# Patient Record
Sex: FEMALE | Race: WHITE | Employment: OTHER | URBAN - METROPOLITAN AREA
[De-identification: names, ages, dates, MRNs, and addresses within clinical notes are randomized per-mention and may not be internally consistent; named-entity substitution may affect disease eponyms.]

---

## 2017-04-24 PROCEDURE — 82607 VITAMIN B-12: CPT | Performed by: FAMILY MEDICINE

## 2017-09-19 PROCEDURE — 87081 CULTURE SCREEN ONLY: CPT | Performed by: EMERGENCY MEDICINE

## 2018-06-18 PROCEDURE — 82607 VITAMIN B-12: CPT | Performed by: FAMILY MEDICINE

## 2018-06-18 PROCEDURE — 88175 CYTOPATH C/V AUTO FLUID REDO: CPT | Performed by: NURSE PRACTITIONER

## 2018-09-06 ENCOUNTER — OFFICE VISIT (OUTPATIENT)
Dept: SURGERY | Facility: CLINIC | Age: 68
End: 2018-09-06
Payer: MEDICARE

## 2018-09-06 VITALS
SYSTOLIC BLOOD PRESSURE: 117 MMHG | BODY MASS INDEX: 30.76 KG/M2 | TEMPERATURE: 98 F | HEIGHT: 67 IN | WEIGHT: 196 LBS | DIASTOLIC BLOOD PRESSURE: 75 MMHG

## 2018-09-06 DIAGNOSIS — Z80.0 FAMILY HISTORY OF COLON CANCER: ICD-10-CM

## 2018-09-06 DIAGNOSIS — K21.9 GASTROESOPHAGEAL REFLUX DISEASE, ESOPHAGITIS PRESENCE NOT SPECIFIED: ICD-10-CM

## 2018-09-06 DIAGNOSIS — K63.5 POLYP OF COLON, UNSPECIFIED PART OF COLON, UNSPECIFIED TYPE: Primary | ICD-10-CM

## 2018-09-06 DIAGNOSIS — I10 ESSENTIAL HYPERTENSION, BENIGN: ICD-10-CM

## 2018-09-06 PROCEDURE — 99203 OFFICE O/P NEW LOW 30 MIN: CPT | Performed by: COLON & RECTAL SURGERY

## 2018-09-06 RX ORDER — POLYETHYLENE GLYCOL 3350, SODIUM CHLORIDE, SODIUM BICARBONATE, POTASSIUM CHLORIDE 420; 11.2; 5.72; 1.48 G/4L; G/4L; G/4L; G/4L
POWDER, FOR SOLUTION ORAL
Qty: 1 BOTTLE | Refills: 0 | Status: SHIPPED | OUTPATIENT
Start: 2018-09-06 | End: 2019-01-07 | Stop reason: ALTCHOICE

## 2018-09-06 NOTE — PATIENT INSTRUCTIONS
The patient is a pleasant 81-year-old female who presents in consultation for a colonoscopy. The patient is referred to me by her primary provider, Dr. Deal. The patient has had a prior colonoscopy. The last one was about 3 years ago.   She has a pers prophylaxis. All risks, benefits, complications and alternatives to the proposed operation were fully discussed with the patient. All questions from the patient were answered in detail.  A description of the procedure and its possible outcomes was full

## 2018-09-06 NOTE — H&P
New Patient Visit Note       Active Problems      1. Polyp of colon, unspecified part of colon, unspecified type    2. Gastroesophageal reflux disease, esophagitis presence not specified    3. Essential hypertension, benign    4.  Family history of colon ca the PA  I performed my own physical exam and obtained the history as detailed above.   I have made all appropriate changes in documentation as necessary  I attest to the accuracy of this note as detailed above    Kevin Morales MD [facility] 68    My total Family History    The past medical and past surgical history have been reviewed by me today.     Past Medical History:   Diagnosis Date   • Abnormal screening CT of heart 5/9/2016    calcium score 28   • Allergic rhinitis due to pollen (aka 4770)    • Anxie Disorder Mother      Pe   • Colon Cancer Mother    • Infectious Disease Daughter      viral meningitis   • Obesity Sister    • Hypertension Sister    • Infectious Disease Sister      MRSA   • Pulmonary Disease Sister      on O2   • Heart Attack Sister    • Outpatient Prescriptions:  Losartan Potassium 50 MG Oral Tab Take 1 tablet (50 mg total) by mouth daily. Disp: 90 tablet Rfl: 3   Nabumetone 500 MG Oral Tab Take 1 tablet (500 mg total) by mouth daily.  Disp: 90 tablet Rfl: 3   Citalopram Hydrobromide 20 MG problems and sleep disturbance.        Physical Findings   /75 (BP Location: Left arm, Patient Position: Sitting, Cuff Size: adult)   Temp 98.1 °F (36.7 °C) (Oral)   Ht 67\"   Wt 196 lb   BMI 30.70 kg/m²   Physical Exam   Constitutional: She is orient since age 49. The patient denies any new constitutional or gastrointestinal symptoms since her last examination. She specifically denies unintended weight loss, loss of appetite, fatigue, nausea/vomiting, diarrhea/constipation or blood in the stool. encounter. Imaging & Referrals   None    Follow Up  Return in 5 days (on 9/11/2018) for colonoscopy.     Apryl Alfaro MD

## 2019-04-15 ENCOUNTER — APPOINTMENT (OUTPATIENT)
Dept: LAB | Facility: HOSPITAL | Age: 69
End: 2019-04-15
Payer: MEDICARE

## 2019-04-15 DIAGNOSIS — S83.249A TEAR OF MEDIAL MENISCUS OF KNEE JOINT: ICD-10-CM

## 2019-04-15 PROCEDURE — 93010 ELECTROCARDIOGRAM REPORT: CPT | Performed by: INTERNAL MEDICINE

## 2019-04-15 PROCEDURE — 80048 BASIC METABOLIC PNL TOTAL CA: CPT

## 2019-04-15 PROCEDURE — 93005 ELECTROCARDIOGRAM TRACING: CPT

## 2019-04-15 PROCEDURE — 36415 COLL VENOUS BLD VENIPUNCTURE: CPT

## 2019-04-19 ENCOUNTER — HOSPITAL ENCOUNTER (OUTPATIENT)
Facility: HOSPITAL | Age: 69
Setting detail: HOSPITAL OUTPATIENT SURGERY
Discharge: HOME | End: 2019-04-19
Attending: ORTHOPAEDIC SURGERY | Admitting: ORTHOPAEDIC SURGERY
Payer: MEDICARE

## 2019-04-19 ENCOUNTER — ANESTHESIA EVENT (OUTPATIENT)
Dept: SURGERY | Facility: HOSPITAL | Age: 69
End: 2019-04-19
Payer: MEDICARE

## 2019-04-19 ENCOUNTER — ANESTHESIA (OUTPATIENT)
Dept: SURGERY | Facility: HOSPITAL | Age: 69
End: 2019-04-19
Payer: MEDICARE

## 2019-04-19 VITALS
HEART RATE: 76 BPM | WEIGHT: 195.31 LBS | SYSTOLIC BLOOD PRESSURE: 110 MMHG | RESPIRATION RATE: 16 BRPM | OXYGEN SATURATION: 95 % | HEIGHT: 67 IN | TEMPERATURE: 99 F | BODY MASS INDEX: 30.65 KG/M2 | DIASTOLIC BLOOD PRESSURE: 64 MMHG

## 2019-04-19 DIAGNOSIS — S83.242A TEAR OF MEDIAL MENISCUS OF LEFT KNEE, UNSPECIFIED TEAR TYPE, UNSPECIFIED WHETHER OLD OR CURRENT TEAR, INITIAL ENCOUNTER: ICD-10-CM

## 2019-04-19 DIAGNOSIS — S83.249A TEAR OF MEDIAL MENISCUS OF KNEE JOINT: Primary | ICD-10-CM

## 2019-04-19 DIAGNOSIS — M23.201 OLD TEAR OF LATERAL MENISCUS OF LEFT KNEE, UNSPECIFIED TEAR TYPE: ICD-10-CM

## 2019-04-19 PROBLEM — M23.42 CHONDRAL LOOSE BODY OF LEFT KNEE JOINT: Status: ACTIVE | Noted: 2019-04-19

## 2019-04-19 PROBLEM — M17.12 PRIMARY OSTEOARTHRITIS OF LEFT KNEE: Status: ACTIVE | Noted: 2019-04-19

## 2019-04-19 PROBLEM — M23.322 DEGENERATIVE TEAR OF POSTERIOR HORN OF MEDIAL MENISCUS, LEFT: Status: ACTIVE | Noted: 2019-04-19

## 2019-04-19 PROBLEM — M23.301 DEGENERATIVE TEAR OF LATERAL MENISCUS, LEFT: Status: ACTIVE | Noted: 2019-04-19

## 2019-04-19 PROCEDURE — 0SBD4ZZ EXCISION OF LEFT KNEE JOINT, PERCUTANEOUS ENDOSCOPIC APPROACH: ICD-10-PCS | Performed by: ORTHOPAEDIC SURGERY

## 2019-04-19 RX ORDER — ONDANSETRON 2 MG/ML
4 INJECTION INTRAMUSCULAR; INTRAVENOUS AS NEEDED
Status: DISCONTINUED | OUTPATIENT
Start: 2019-04-19 | End: 2019-04-19

## 2019-04-19 RX ORDER — HYDROMORPHONE HYDROCHLORIDE 1 MG/ML
0.4 INJECTION, SOLUTION INTRAMUSCULAR; INTRAVENOUS; SUBCUTANEOUS EVERY 5 MIN PRN
Status: DISCONTINUED | OUTPATIENT
Start: 2019-04-19 | End: 2019-04-19

## 2019-04-19 RX ORDER — TRAMADOL HYDROCHLORIDE 50 MG/1
TABLET ORAL
Qty: 40 TABLET | Refills: 0 | Status: SHIPPED | OUTPATIENT
Start: 2019-04-19 | End: 2019-07-08

## 2019-04-19 RX ORDER — HYDROCODONE BITARTRATE AND ACETAMINOPHEN 10; 325 MG/1; MG/1
1 TABLET ORAL AS NEEDED
Status: DISCONTINUED | OUTPATIENT
Start: 2019-04-19 | End: 2019-04-19

## 2019-04-19 RX ORDER — NALOXONE HYDROCHLORIDE 0.4 MG/ML
80 INJECTION, SOLUTION INTRAMUSCULAR; INTRAVENOUS; SUBCUTANEOUS AS NEEDED
Status: DISCONTINUED | OUTPATIENT
Start: 2019-04-19 | End: 2019-04-19

## 2019-04-19 RX ORDER — ACETAMINOPHEN 500 MG
1000 TABLET ORAL ONCE
Status: DISCONTINUED | OUTPATIENT
Start: 2019-04-19 | End: 2019-04-19

## 2019-04-19 RX ORDER — CEFAZOLIN SODIUM/WATER 2 G/20 ML
SYRINGE (ML) INTRAVENOUS
Status: DISCONTINUED
Start: 2019-04-19 | End: 2019-04-19

## 2019-04-19 RX ORDER — CEFAZOLIN SODIUM/WATER 2 G/20 ML
2 SYRINGE (ML) INTRAVENOUS ONCE
Status: COMPLETED | OUTPATIENT
Start: 2019-04-19 | End: 2019-04-19

## 2019-04-19 RX ORDER — BUPIVACAINE HYDROCHLORIDE AND EPINEPHRINE 5; 5 MG/ML; UG/ML
INJECTION, SOLUTION EPIDURAL; INTRACAUDAL; PERINEURAL AS NEEDED
Status: DISCONTINUED | OUTPATIENT
Start: 2019-04-19 | End: 2019-04-19

## 2019-04-19 RX ORDER — HYDROCODONE BITARTRATE AND ACETAMINOPHEN 10; 325 MG/1; MG/1
2 TABLET ORAL AS NEEDED
Status: DISCONTINUED | OUTPATIENT
Start: 2019-04-19 | End: 2019-04-19

## 2019-04-19 RX ORDER — MIDAZOLAM HYDROCHLORIDE 1 MG/ML
1 INJECTION INTRAMUSCULAR; INTRAVENOUS EVERY 5 MIN PRN
Status: DISCONTINUED | OUTPATIENT
Start: 2019-04-19 | End: 2019-04-19

## 2019-04-19 RX ORDER — SODIUM CHLORIDE, SODIUM LACTATE, POTASSIUM CHLORIDE, CALCIUM CHLORIDE 600; 310; 30; 20 MG/100ML; MG/100ML; MG/100ML; MG/100ML
INJECTION, SOLUTION INTRAVENOUS CONTINUOUS
Status: DISCONTINUED | OUTPATIENT
Start: 2019-04-19 | End: 2019-04-19

## 2019-04-19 RX ORDER — ACETAMINOPHEN 500 MG
1000 TABLET ORAL ONCE
COMMUNITY
End: 2019-07-08

## 2019-04-19 RX ORDER — METOCLOPRAMIDE HYDROCHLORIDE 5 MG/ML
10 INJECTION INTRAMUSCULAR; INTRAVENOUS AS NEEDED
Status: DISCONTINUED | OUTPATIENT
Start: 2019-04-19 | End: 2019-04-19

## 2019-04-19 NOTE — DISCHARGE SUMMARY
Patient ID:  Tarun Friday  [id number]  84 year old  4/2/1950    Admit Date: 4/19/2019    Discharge Date and Time: 4/19/2019     Attending Physician: Regan Orantes MD    Reason for admission: Tear of medial meniscus of left knee, unspecified tear typ humerus    Omeprazole Magnesium (PRILOSEC OTC) 20 MG Oral Tab EC  Take 1 tablet by mouth 2 (two) times daily.   Qty: 60 tablet Refills: 11  Associated Diagnoses:GERD (gastroesophageal reflux disease)    CALCIUM 500 OR  qd    Nabumetone 500 MG Oral Tab  Take

## 2019-04-19 NOTE — ANESTHESIA PREPROCEDURE EVALUATION
PRE-OP EVALUATION    Patient Name: Lucio Torrez    Pre-op Diagnosis: Tear of medial meniscus of left knee, unspecified tear type, unspecified whether old or current tear, initial encounter [J84.959I]  Old tear of lateral meniscus of left knee, unspec Pulmonary                           Neuro/Psych      (+) depression                              Past Surgical History:   Procedure Laterality Date   • ANESTH,SHOULDER REPLACEMENT Right 2/6/2015    right reverse shoulder replacement   • COLONOSCOPY  3/8/ Admission:  **None**

## 2019-04-19 NOTE — ANESTHESIA POSTPROCEDURE EVALUATION
[address] Patient Status:  Hospital Outpatient Surgery   Age/Gender 77year old female MRN [id number]   [facility] SURGERY Attending Belinda Rosas MD   Hosp Day # 0 PCP Simba Chase MD       Anesthesia Post-op N

## 2019-04-19 NOTE — H&P
[facility]    PATIENT'S NAME: Mary Hoang   ATTENDING PHYSICIAN: Francesca Bryant M.D.    PATIENT ACCOUNT#:   [de-identified]    LOCATION:  OR  OR [place] ROOMS 3 EDW  MEDICAL RECORD #:   [id number]       YOB: 1950  ADMISSION DATE: Hypercholesterolemia, hypertension, chronic depression, GERD, vitamin B12 deficiency, colonic polyps. PAST SURGICAL HISTORY:  Knee arthroscopy in 1993, CT scan of the heart, colonoscopy, D and C, excision of skin malignancy.     MEDICATIONS:  Amoxil, lut contraindication to the proposed surgery.     Dictated By Jose Alejandro Murphy M.D.  d: 04/19/2019 07:05:07  t: 04/19/2019 08:01:42  [facility] [id number]  [facility]/

## 2019-04-19 NOTE — BRIEF OP NOTE
Pre-Operative Diagnosis: Tear of medial meniscus of left knee, unspecified tear type, unspecified whether old or current tear, initial encounter [S83.242A]  Old tear of lateral meniscus of left knee, unspecified tear type [M23.201]     Post-Operative Diagn

## 2019-04-20 NOTE — OPERATIVE REPORT
[facility]    PATIENT'S NAME: Mary Hoang   ATTENDING PHYSICIAN: Derrek Hair M.D. OPERATING PHYSICIAN: Derrek Hair M.D.    PATIENT ACCOUNT#:   [de-identified]    [facility] EDWP 10  MEDICAL RECORD #:   [id number] incision, a dull trocar was passed and a cannula for the arthroscope.   Suprapatellar pouch was inspected first.  It appeared to be normal.  The articular surface the patella did show grade 4 chondromalacia changes, especially involving the inferior pole on tourniquet was released after 41 minutes. There was good capillary refill following release of the tourniquet. Blood loss was minimal, less than 2 mL. There was no specimen. The loose body was photographed and discarded.   The patient went to recovery r

## 2019-04-22 PROBLEM — Z47.89 ORTHOPEDIC AFTERCARE: Status: ACTIVE | Noted: 2019-04-22

## 2019-04-25 ENCOUNTER — ORDER TRANSCRIPTION (OUTPATIENT)
Dept: PHYSICAL THERAPY | Facility: HOSPITAL | Age: 69
End: 2019-04-25

## 2019-04-25 DIAGNOSIS — Z98.890 S/P LEFT KNEE ARTHROSCOPY: Primary | ICD-10-CM

## 2019-04-29 ENCOUNTER — HOSPITAL ENCOUNTER (OUTPATIENT)
Dept: PHYSICAL THERAPY | Facility: HOSPITAL | Age: 69
Setting detail: THERAPIES SERIES
Discharge: HOME | End: 2019-04-29
Attending: ORTHOPAEDIC SURGERY
Payer: MEDICARE

## 2019-04-29 DIAGNOSIS — Z98.890 S/P LEFT KNEE ARTHROSCOPY: ICD-10-CM

## 2019-04-29 PROCEDURE — 97110 THERAPEUTIC EXERCISES: CPT

## 2019-04-29 PROCEDURE — 97161 PT EVAL LOW COMPLEX 20 MIN: CPT

## 2019-04-29 NOTE — PROGRESS NOTES
POST-OP KNEE EVALUATION:   Referring Physician: Dr. Cinthya Sandoval  Diagnosis: s/p L knee arthroscopy, L medial meniscus tear (OA L knee, central loose body Date of Service: 4/29/2019     PATIENT SUMMARY   Krys Mc is a 77year old y/o female who presen knee all aspects, tender portals, tender L ITB and lateral gastrocnemius  Edema: at knee jt line R: 39 cm, L 40 cm; 10 cm above jt line R: 44 cm, L 46 cm; 10 cm below jt line R: 37.5 cm, L 37.5 cm  Sensation: intact light touch, symmetrically, no neuro com extraneous movement and 5x sit<>stand test <15 seconds to improve safety and independence with gait on uneven surfaces such as grass,   · Pt will be independent and compliant with comprehensive HEP to maintain progress achieved in PT    Frequency / Duratio

## 2019-05-01 ENCOUNTER — HOSPITAL ENCOUNTER (OUTPATIENT)
Dept: PHYSICAL THERAPY | Facility: HOSPITAL | Age: 69
Setting detail: THERAPIES SERIES
Discharge: HOME | End: 2019-05-01
Attending: ORTHOPAEDIC SURGERY
Payer: MEDICARE

## 2019-05-01 PROCEDURE — 97140 MANUAL THERAPY 1/> REGIONS: CPT

## 2019-05-01 PROCEDURE — 97110 THERAPEUTIC EXERCISES: CPT

## 2019-05-01 NOTE — PROGRESS NOTES
Dx: s/p L knee arthroscopy -medial meniscus  partialmenisectomy         Authorized # of Visits:  [address] visits POC         Next MD visit: none scheduled  Fall Risk: standard         Precautions: n/a             Subjective: Doing HEP.  Walked 2, 1/3 mil

## 2019-05-08 ENCOUNTER — APPOINTMENT (OUTPATIENT)
Dept: PHYSICAL THERAPY | Facility: HOSPITAL | Age: 69
End: 2019-05-08
Payer: MEDICARE

## 2019-05-09 ENCOUNTER — HOSPITAL ENCOUNTER (OUTPATIENT)
Dept: PHYSICAL THERAPY | Facility: HOSPITAL | Age: 69
Setting detail: THERAPIES SERIES
Discharge: HOME | End: 2019-05-09
Attending: ORTHOPAEDIC SURGERY
Payer: MEDICARE

## 2019-05-09 PROCEDURE — 97530 THERAPEUTIC ACTIVITIES: CPT

## 2019-05-09 PROCEDURE — 97110 THERAPEUTIC EXERCISES: CPT

## 2019-05-09 PROCEDURE — 97140 MANUAL THERAPY 1/> REGIONS: CPT

## 2019-05-09 NOTE — PROGRESS NOTES
Dx: s/p L knee arthroscopy -medial meniscus  partialmenisectomy         Authorized # of Visits:  [address] visits POC         Next MD visit: none scheduled  Fall Risk: standard         Precautions: n/a             Subjective: Wants to know if she can go x1'-brushing teeth Simulated BARRE class         Simulated putting with 4# weighted bar x1'     Simulated golf swing with unweighted bar             Stair negotiation  1 flight (12 steps)    Modified descending 1 step - x20-HEP          Charges: OSCAR Lino M

## 2019-05-13 ENCOUNTER — HOSPITAL ENCOUNTER (OUTPATIENT)
Dept: PHYSICAL THERAPY | Facility: HOSPITAL | Age: 69
Setting detail: THERAPIES SERIES
Discharge: HOME | End: 2019-05-13
Attending: ORTHOPAEDIC SURGERY
Payer: MEDICARE

## 2019-05-13 PROCEDURE — 97530 THERAPEUTIC ACTIVITIES: CPT

## 2019-05-13 PROCEDURE — 97110 THERAPEUTIC EXERCISES: CPT

## 2019-05-13 PROCEDURE — 97140 MANUAL THERAPY 1/> REGIONS: CPT

## 2019-05-13 NOTE — PROGRESS NOTES
Dx: s/p L knee arthroscopy -medial meniscus  partialmenisectomy         Authorized # of Visits:  [address] visits POC         Next MD visit: none scheduled  Fall Risk: standard         Precautions: n/a             Subjective:  Went back to barre class and light ball throw x10 SLS rebounder 1# x10       HEP progression-walking PRN, SLS x1'-brushing teeth Simulated BARRE class SLR 1.5# x30        Simulated putting with 4# weighted bar x1'     Simulated golf swing with unweighted bar     Stepping over hurdles

## 2019-05-15 ENCOUNTER — HOSPITAL ENCOUNTER (OUTPATIENT)
Dept: PHYSICAL THERAPY | Facility: HOSPITAL | Age: 69
Setting detail: THERAPIES SERIES
Discharge: HOME | End: 2019-05-15
Attending: ORTHOPAEDIC SURGERY
Payer: MEDICARE

## 2019-05-15 PROCEDURE — 97140 MANUAL THERAPY 1/> REGIONS: CPT

## 2019-05-15 PROCEDURE — 97530 THERAPEUTIC ACTIVITIES: CPT

## 2019-05-15 PROCEDURE — 97110 THERAPEUTIC EXERCISES: CPT

## 2019-05-15 NOTE — PROGRESS NOTES
Dx: s/p L knee arthroscopy -medial meniscus  partialmenisectomy         Authorized # of Visits:  [address] visits POC         Next MD visit: none scheduled  Fall Risk: standard         Precautions: n/a             Subjective: L knee feeling good today. Shuttle  DLP 37# x30  SLP 25# L x20  Heel raises 37#  20 reps  Shuttle  DLP 37# x30  SLP 25# L x20  Heel raises 37#  20 reps  Shuttle  DLP 50# x30  SLP 25# L x20  Heel raises 37#  20 reps Shuttle  DLP 50# x30    SLP 25# L x20  Heel raises 37#  20 reps

## 2019-05-20 ENCOUNTER — HOSPITAL ENCOUNTER (OUTPATIENT)
Dept: PHYSICAL THERAPY | Facility: HOSPITAL | Age: 69
Setting detail: THERAPIES SERIES
Discharge: HOME | End: 2019-05-20
Attending: ORTHOPAEDIC SURGERY
Payer: MEDICARE

## 2019-05-20 PROCEDURE — 97140 MANUAL THERAPY 1/> REGIONS: CPT

## 2019-05-20 PROCEDURE — 97110 THERAPEUTIC EXERCISES: CPT

## 2019-05-20 PROCEDURE — 97530 THERAPEUTIC ACTIVITIES: CPT

## 2019-05-20 NOTE — PROGRESS NOTES
Dx: s/p L knee arthroscopy -medial meniscus  partialmenisectomy         Authorized # of Visits:  [address] visits POC         Next MD visit: none scheduled  Fall Risk: standard         Precautions: n/a           Subjective: stair negotiation is getting e sec hold x15 LSU BOSU  w 3 sec hold x15 ASU BOSU  w 3 sec hold x15     SS orange spri   SS, monster walk FW/BW x1' ea     lunge FW/Ext BOSU x30 BOSU lunge R/L x20 wo UE   BOSU lunge R/L x20 wo UE    ASU BOSU x15     BOSU lunge R/L x20 wo UE    ASU BOSU x15

## 2019-05-22 ENCOUNTER — HOSPITAL ENCOUNTER (OUTPATIENT)
Dept: PHYSICAL THERAPY | Facility: HOSPITAL | Age: 69
Setting detail: THERAPIES SERIES
Discharge: HOME | End: 2019-05-22
Attending: ORTHOPAEDIC SURGERY
Payer: MEDICARE

## 2019-05-22 PROCEDURE — 97110 THERAPEUTIC EXERCISES: CPT

## 2019-05-22 PROCEDURE — 97140 MANUAL THERAPY 1/> REGIONS: CPT

## 2019-05-22 PROCEDURE — 97112 NEUROMUSCULAR REEDUCATION: CPT

## 2019-05-22 NOTE — PROGRESS NOTES
Dx: s/p L knee arthroscopy -medial meniscus  partialmenisectomy         Authorized # of Visits:  [address] visits POC         Next MD visit: none scheduled  Fall Risk: standard         Precautions: n/a           Subjective: Did some gardening today but w BOSU  w 3 sec hold x20 + punch R UE up    SS orange spri   SS, monster walk FW/BW x1' ea     lunge FW/Ext BOSU x30 BOSU lunge R/L x20 wo UE   BOSU lunge R/L x20 wo UE    ASU BOSU x15     BOSU lunge R/L x20 wo UE    ASU BOSU x15   BOSU lunge R/L x20 wo UE functional squat x20 invert BOSU functional squat x20      Charges: Zoie, NM Re-ed, MT   Total Timed Treatment: 45 min     Total Treatment Time: 45 min

## 2019-05-29 ENCOUNTER — HOSPITAL ENCOUNTER (OUTPATIENT)
Dept: PHYSICAL THERAPY | Facility: HOSPITAL | Age: 69
Setting detail: THERAPIES SERIES
Discharge: HOME | End: 2019-05-29
Attending: ORTHOPAEDIC SURGERY
Payer: MEDICARE

## 2019-05-29 PROCEDURE — 97110 THERAPEUTIC EXERCISES: CPT

## 2019-05-29 PROCEDURE — 97112 NEUROMUSCULAR REEDUCATION: CPT

## 2019-05-29 NOTE — PROGRESS NOTES
Dx: s/p L knee arthroscopy -medial meniscus  partialmenisectomy         Authorized # of Visits:  [address] visits POC         Next MD visit: none scheduled  Fall Risk: standard         Precautions: n/a           Subjective:  Went to golfing range-did putt generalized knee  Patellar mobes x10 ea direction    PROM-  supine x50 STM L suprapatellar/distal quad/ generalized knee    PROM-  supine x30   BB  F/B x30 ASU BOSU x10 LSU BOSU  w 3 sec hold x15 LSU BOSU  w 3 sec hold x15 ASU BOSU  w 3 sec hold x15 ASU VIANEY x30    Fast marching tramp x1'    Simulated putting with 4# weighted bar x1'     Simulated golf swing with unweighted bar     Stepping over hurdles (4)  FW, SS x5 laps ea wo UE 2 large aeromat FW/B, SS x3 ea    SLS L aeromat x1' attempts 2 large aeromat FW

## 2019-06-03 ENCOUNTER — HOSPITAL ENCOUNTER (OUTPATIENT)
Dept: PHYSICAL THERAPY | Facility: HOSPITAL | Age: 69
Setting detail: THERAPIES SERIES
Discharge: HOME | End: 2019-06-03
Attending: FAMILY MEDICINE
Payer: MEDICARE

## 2019-06-03 PROCEDURE — 97110 THERAPEUTIC EXERCISES: CPT

## 2019-06-03 PROCEDURE — 97112 NEUROMUSCULAR REEDUCATION: CPT

## 2019-06-03 NOTE — PROGRESS NOTES
Dx: s/p L knee arthroscopy -medial meniscus  partialmenisectomy         Authorized # of Visits:  [address] visits POC         Next MD visit: none scheduled  Fall Risk: standard         Precautions: n/a           Subjective:  Went golfing and did water aer 30 sec - increased extraneous movement     Functional Mobility:  5x sit<>stand: 10 (WAS:21 sec, no UE  - had some L knee pain)  TUG (AD, time): 10 sec         PLAN OF CARE:    Goals: (To be met in 12 visits)   · Pt will improve knee extension ROM to 0 deg re-assessment   STM L suprapatellar/distal quad/ generalized knee  Patellar mobes x10 ea direction    PROM-sitting with inferior patellar mobes x50 STM/IASTM L suprapatellar/distal quad/ generalized knee  Patellar mobes x10 ea direction    PROM-  supine x5 orange spri x1'    Monster walk FW/BW orange spri x1' Stair negotiation 12 steps wo UE x1' Hip abd, ext R/L x15 orange spri  Min UE assist    Monster walk FW, BW, SS x1' ea orange spri Hip abd, ext R/L x15 orange spri  Min UE assist    Monster walk FW, BW,

## 2019-06-05 ENCOUNTER — HOSPITAL ENCOUNTER (OUTPATIENT)
Dept: PHYSICAL THERAPY | Facility: HOSPITAL | Age: 69
Setting detail: THERAPIES SERIES
Discharge: HOME | End: 2019-06-05
Attending: FAMILY MEDICINE
Payer: MEDICARE

## 2019-06-05 PROCEDURE — 97110 THERAPEUTIC EXERCISES: CPT

## 2019-06-05 PROCEDURE — 97112 NEUROMUSCULAR REEDUCATION: CPT

## 2019-06-05 NOTE — PROGRESS NOTES
Discharge Summary    Pt has attended 12 visits in Physical Therapy.      Dx: s/p L knee arthroscopy -medial meniscus  partialmenisectomy         Authorized # of Visits:  [address] visits POC         Next MD visit: none scheduled  Fall Risk: standard sec, no UE  - had some L knee pain)  TUG (AD, time): 10 sec         PLAN OF CARE:    Goals: (To be met in 12 visits)   · Pt will improve knee extension ROM to 0 deg to allow proper heel strike during gait and terminal knee extension in stance-MET  · Pt krystian Upright bike L2-    Full revolution x6 min Upright bike L2-    Full revolution x 5 min Formal re-assessment initiated    Resisted walking FW/BW 20# x10    Resisted walking + BOSU lunge R/L 20# x10    Resisted walking + step up BOSU x10 L Finished re-assess Shuttle  DLP 50# x30    SLP 25# L x20  Heel raises 37#  20 reps Shuttle  DLP 56# x30    SLP 31# L x20    Heel raises 37#  30 reps Shuttle  DLP 56# x30    SLP 31# L x20    Heel raises 37#  30 reps Shuttle  DLP 56# x30    SLP 31# L x20    Heel raises 56#  10 functional squat x10 invert BOSU functional squat x20 invert BOSU functional squat x20 invert BOSU functional squat x20 FW/BW walking large aeromat x5 laps ea     -     Charges: TEx2 (MT incorporated into charge), NM Re-ed   Total Timed Treatment: 40 min

## 2019-06-10 ENCOUNTER — APPOINTMENT (OUTPATIENT)
Dept: PHYSICAL THERAPY | Facility: HOSPITAL | Age: 69
End: 2019-06-10
Attending: FAMILY MEDICINE
Payer: MEDICARE

## 2019-06-12 ENCOUNTER — APPOINTMENT (OUTPATIENT)
Dept: PHYSICAL THERAPY | Facility: HOSPITAL | Age: 69
End: 2019-06-12
Attending: FAMILY MEDICINE
Payer: MEDICARE

## 2020-12-02 PROBLEM — H02.422 PTOSIS, MYOGENIC, LEFT: Status: ACTIVE | Noted: 2020-06-26

## 2020-12-02 PROBLEM — H50.22 VERTICAL STRABISMUS, LEFT EYE: Status: ACTIVE | Noted: 2020-03-12

## 2020-12-02 PROBLEM — H02.421 PTOSIS, MYOGENIC, RIGHT: Status: ACTIVE | Noted: 2020-06-26

## 2021-02-02 ENCOUNTER — IMMUNIZATION (OUTPATIENT)
Dept: LAB | Age: 71
End: 2021-02-02

## 2021-02-02 DIAGNOSIS — Z23 NEED FOR VACCINATION: Primary | ICD-10-CM

## 2021-02-02 PROCEDURE — 91301 COVID-19 MODERNA VACCINE: CPT

## 2021-02-02 PROCEDURE — 0011A COVID-19 MODERNA VACCINE: CPT

## 2021-02-04 ENCOUNTER — ORDER TRANSCRIPTION (OUTPATIENT)
Dept: PHYSICAL THERAPY | Facility: HOSPITAL | Age: 71
End: 2021-02-04

## 2021-02-04 DIAGNOSIS — M79.671 PAIN OF RIGHT HEEL: Primary | ICD-10-CM

## 2021-02-10 ENCOUNTER — OFFICE VISIT (OUTPATIENT)
Dept: PHYSICAL THERAPY | Facility: HOSPITAL | Age: 71
End: 2021-02-10
Attending: ORTHOPAEDIC SURGERY
Payer: MEDICARE

## 2021-02-10 DIAGNOSIS — M79.671 PAIN OF RIGHT HEEL: ICD-10-CM

## 2021-02-10 PROCEDURE — 97110 THERAPEUTIC EXERCISES: CPT

## 2021-02-10 PROCEDURE — 97140 MANUAL THERAPY 1/> REGIONS: CPT

## 2021-02-10 PROCEDURE — 97161 PT EVAL LOW COMPLEX 20 MIN: CPT

## 2021-02-10 NOTE — PROGRESS NOTES
LOWER EXTREMITY EVALUATION:   Referring Physician: Dr. Aydin Romero  Diagnosis: Pain of right heel (M79.671)  Right knee pain, unspecified chronicity (M25.561)  Primary localized osteoarthrosis of right lower leg (M17.11)  Gait abnormality (R26.9)  Plantar fas medial meniscus of left knee     surgery scheduled 04/19/19   • Unspecified internal derangement of knee     right knee        ASSESSMENT  Lissa Pateljjar presents to physical therapy evaluation with primary c/o R heel pain.  The results of the objective tests and denotes performed with pain)  Knee Foot/Ankle   Flexion: R 5/5; L 5/5  Extension: R 5/5; L 5/5    Hip 5/5 except  Abduction R 4/5  L 4+/5  Extension R 4/5  L 4+/5    DF: R 5/5; L 5/5  PF: R 5/5; L 5/5  INV: R 5/5; L 5/5  EV: R 5/5; L 5/5  Great toe ext: R kandis   · Pt will be independent and compliant with comprehensive HEP to maintain progress achieved in PT      Frequency / Duration: Patient will be seen for 2 x/week or a total of 12 visits over a 90 day period.  Treatment will include: Gait training, [employer]

## 2021-02-11 ENCOUNTER — TELEPHONE (OUTPATIENT)
Dept: PHYSICAL THERAPY | Facility: HOSPITAL | Age: 71
End: 2021-02-11

## 2021-02-15 ENCOUNTER — OFFICE VISIT (OUTPATIENT)
Dept: PHYSICAL THERAPY | Facility: HOSPITAL | Age: 71
End: 2021-02-15
Attending: ORTHOPAEDIC SURGERY
Payer: MEDICARE

## 2021-02-15 PROCEDURE — 97140 MANUAL THERAPY 1/> REGIONS: CPT

## 2021-02-15 PROCEDURE — 97110 THERAPEUTIC EXERCISES: CPT

## 2021-02-15 NOTE — PROGRESS NOTES
Dx: Pain of right heel (M79.671)  Right knee pain, unspecified chronicity (M25.561)  Primary localized osteoarthrosis of right lower leg (M17.11)  Gait abnormality (R26.9)  Plantar fasciitis of right foot (M72.2)         Authorized # of Visits:  Medicare, therapy:    STM/GIASTM R plantar fascia    Jt mobes-  Talocrural joint AP, PA gr II-III  multiple bouts                  Charges: Zoie 1, MTx2   Total Timed Treatment: 45 min     Total Treatment Time: 45 min    Initial evaluation:   Observation: L > R prona

## 2021-02-17 ENCOUNTER — OFFICE VISIT (OUTPATIENT)
Dept: PHYSICAL THERAPY | Facility: HOSPITAL | Age: 71
End: 2021-02-17
Attending: ORTHOPAEDIC SURGERY
Payer: MEDICARE

## 2021-02-17 PROCEDURE — 97110 THERAPEUTIC EXERCISES: CPT

## 2021-02-17 PROCEDURE — 97140 MANUAL THERAPY 1/> REGIONS: CPT

## 2021-02-17 NOTE — PROGRESS NOTES
Dx: Pain of right heel (M79.671)  Right knee pain, unspecified chronicity (M25.561)  Primary localized osteoarthrosis of right lower leg (M17.11)  Gait abnormality (R26.9)  Plantar fasciitis of right foot (M72.2)         Authorized # of Visits:  Medicare, fascia/achilles   Ther Ex:     BB DF stretch 20 sec x3    BB center x1'    BB F/B x20    SLS rebounder 2# x20      Heel raises x20    Toe raises x10    Windshield wiper x20     marbles x4 min    Articulating arch 10 sec x10     PROM R ankle    MRE's

## 2021-02-24 ENCOUNTER — OFFICE VISIT (OUTPATIENT)
Dept: PHYSICAL THERAPY | Facility: HOSPITAL | Age: 71
End: 2021-02-24
Attending: ORTHOPAEDIC SURGERY
Payer: MEDICARE

## 2021-02-24 DIAGNOSIS — M79.671 PAIN OF RIGHT HEEL: ICD-10-CM

## 2021-02-24 PROCEDURE — 97140 MANUAL THERAPY 1/> REGIONS: CPT

## 2021-02-24 PROCEDURE — 97110 THERAPEUTIC EXERCISES: CPT

## 2021-02-24 NOTE — PROGRESS NOTES
Dx: Pain of right heel (M79.671)  Right knee pain, unspecified chronicity (M25.561)  Primary localized osteoarthrosis of right lower leg (M17.11)  Gait abnormality (R26.9)  Plantar fasciitis of right foot (M72.2)         Authorized # of Visits:  Medicare, 7/ Date:    Tx#: 8/   Ther Ex:    Sitting-    Heel raises x20    Toe raises x10    Windshield wiper x20     marbles x4 min    Articulating arch 10 sec x10     PROM R ankle    KT-plantar fascia/achilles   Ther Ex:     BB DF stretch 20 sec x3    BB tyrell mod; L mod    Strength/MMT: (* denotes performed with pain)  Knee Foot/Ankle   Flexion: R 5/5; L 5/5  Extension: R 5/5; L 5/5    Hip 5/5 except  Abduction R 4/5  L 4+/5  Extension R 4/5  L 4+/5    DF: R 5/5; L 5/5  PF: R 5/5; L 5/5  INV: R 5/5; L 5/5  EV:

## 2021-03-01 ENCOUNTER — OFFICE VISIT (OUTPATIENT)
Dept: PHYSICAL THERAPY | Facility: HOSPITAL | Age: 71
End: 2021-03-01
Attending: ORTHOPAEDIC SURGERY
Payer: MEDICARE

## 2021-03-01 DIAGNOSIS — M79.671 PAIN OF RIGHT HEEL: ICD-10-CM

## 2021-03-01 PROCEDURE — 97110 THERAPEUTIC EXERCISES: CPT

## 2021-03-01 PROCEDURE — 97140 MANUAL THERAPY 1/> REGIONS: CPT

## 2021-03-01 NOTE — PROGRESS NOTES
Dx: Pain of right heel (M79.671)  Right knee pain, unspecified chronicity (M25.561)  Primary localized osteoarthrosis of right lower leg (M17.11)  Gait abnormality (R26.9)  Plantar fasciitis of right foot (M72.2)         Authorized # of Visits:  Medicare, 7/ Date:    Tx#: 8/   Ther Ex:    Sitting-    Heel raises x20    Toe raises x10    Windshield wiper x20     marbles x4 min    Articulating arch 10 sec x10     PROM R ankle    KT-plantar fascia/achilles   Ther Ex:     BB DF stretch 20 sec x3    BB tyrell multiple bouts     US 1.5w/cm2 7 min, cont R calcaneus           Charges: TEx2 (incorporated US in charge), MTx1   Total Timed Treatment: 45 min     Total Treatment Time: 45 min    Initial evaluation:   Observation: L > R pronated feet, figure 8  R  50cm

## 2021-03-02 ENCOUNTER — IMMUNIZATION (OUTPATIENT)
Dept: LAB | Age: 71
End: 2021-03-02

## 2021-03-02 DIAGNOSIS — Z23 NEED FOR VACCINATION: Primary | ICD-10-CM

## 2021-03-02 PROCEDURE — 0012A COVID-19 MODERNA VACCINE: CPT

## 2021-03-02 PROCEDURE — 91301 COVID-19 MODERNA VACCINE: CPT

## 2021-03-03 ENCOUNTER — OFFICE VISIT (OUTPATIENT)
Dept: PHYSICAL THERAPY | Facility: HOSPITAL | Age: 71
End: 2021-03-03
Attending: ORTHOPAEDIC SURGERY
Payer: MEDICARE

## 2021-03-03 DIAGNOSIS — M79.671 PAIN OF RIGHT HEEL: ICD-10-CM

## 2021-03-03 PROCEDURE — 97110 THERAPEUTIC EXERCISES: CPT

## 2021-03-03 PROCEDURE — 97140 MANUAL THERAPY 1/> REGIONS: CPT

## 2021-03-03 NOTE — PROGRESS NOTES
Dx: Pain of right heel (M79.671)  Right knee pain, unspecified chronicity (M25.561)  Primary localized osteoarthrosis of right lower leg (M17.11)  Gait abnormality (R26.9)  Plantar fasciitis of right foot (M72.2)         Authorized # of Visits:  Medicare, min    Articulating arch 10 sec x10     PROM R ankle    KT-plantar fascia/achilles   Ther Ex:     BB DF stretch 20 sec x3    BB center x1'    BB F/B x20    SLS rebounder 2# x20      Heel raises x20    Toe raises x10    [facility] wiper x20     [employer] therapy:    STM/GIASTM R plantar fascia/  gastroc    Jt mobes-  Talocrural joint AP, PA gr II-III  multiple bouts    Calcaneal eversion mobes gr III multiple bouts  eversion mobes gr III multiple bouts     US 1.5w/cm2 7 min, cont R calcaneus  Manual therap

## 2021-03-08 ENCOUNTER — OFFICE VISIT (OUTPATIENT)
Dept: PHYSICAL THERAPY | Facility: HOSPITAL | Age: 71
End: 2021-03-08
Attending: ORTHOPAEDIC SURGERY
Payer: MEDICARE

## 2021-03-08 DIAGNOSIS — M79.671 PAIN OF RIGHT HEEL: ICD-10-CM

## 2021-03-08 PROCEDURE — 97110 THERAPEUTIC EXERCISES: CPT

## 2021-03-08 PROCEDURE — 97140 MANUAL THERAPY 1/> REGIONS: CPT

## 2021-03-08 NOTE — PROGRESS NOTES
Dx: Pain of right heel (M79.671)  Right knee pain, unspecified chronicity (M25.561)  Primary localized osteoarthrosis of right lower leg (M17.11)  Gait abnormality (R26.9)  Plantar fasciitis of right foot (M72.2)         Authorized # of Visits:  Medicare, 7/ Date: Tx#: 8/ Date: Tx#: Date:    Tx#:   Ther Ex:    Sitting-    Heel raises x20    Toe raises x10    Windshield wiper x20     marbles x4 min    Articulating arch 10 sec x10     PROM R ankle    KT-plantar fascia/achilles   Ther Ex:     BB DF s therapy:    MARILEE/HANG R plantar fascia    Jt mobmichael-  Talocrural joint AP, PA gr II-III  multiple bouts       Manual therapy:    MARILEE/HANG R plantar fascia    Jt mobmichael-  Talocrural joint AP, PA gr II-III  multiple bouts     Calcaneal eversion mobes gr III 4+/5    DF: R 5/5; L 5/5  PF: R 5/5; L 5/5  INV: R 5/5; L 5/5  EV: R 5/5; L 5/5  Great toe ext: R 5/5; L 5/5    Functional strength:  Single leg heel raise R 8x  L 16x     Special tests:   SLS + squat-L min increased genu valgus    Gait: pt ambulates on le

## 2021-03-10 ENCOUNTER — OFFICE VISIT (OUTPATIENT)
Dept: PHYSICAL THERAPY | Facility: HOSPITAL | Age: 71
End: 2021-03-10
Attending: ORTHOPAEDIC SURGERY
Payer: MEDICARE

## 2021-03-10 DIAGNOSIS — M79.671 PAIN OF RIGHT HEEL: ICD-10-CM

## 2021-03-10 PROCEDURE — 97140 MANUAL THERAPY 1/> REGIONS: CPT

## 2021-03-10 PROCEDURE — 97110 THERAPEUTIC EXERCISES: CPT

## 2021-03-10 NOTE — PROGRESS NOTES
Dx: Pain of right heel (M79.671)  Right knee pain, unspecified chronicity (M25.561)  Primary localized osteoarthrosis of right lower leg (M17.11)  Gait abnormality (R26.9)  Plantar fasciitis of right foot (M72.2)         Authorized # of Visits:  Medicare, vacation this weekend for 1 week. Will plan on seeing pt when she returns.      Date: 2/15/2021  Tx#: 2/12 Date: 2/17/2021   Tx#: 3/ Date: 2/24/2021   Tx#: 4/ Date: 3/1/2021   Tx#: 5/ Date: 3/3/2021   Tx#: 6/ Date: 3/8/2021   Tx#: 7/ Date: 3/10/2021   Tx#: x20    Shuttle-  Heel raises  10x 3 dir 37#     [employer] balance system-  Weight shift R/L   Platform 10-   x 2 min   wo UE    PROM R ankle    MRE's R ankle x10 ea    Rhythmic stabilization x1 min    KT R plantar fascia Ther Ex:     NuStep 5min L5     BB DF fascia/  gastroc    Jt mobes-  Talocrural joint AP, PA gr II-III  multiple bouts     Calcaneal eversion mobes gr III multiple bouts  eversion mobes gr III multiple bouts         Charges: TEx2, MTx1   Total Timed Treatment: 45 min     Total Treatment Time:

## 2021-03-22 ENCOUNTER — OFFICE VISIT (OUTPATIENT)
Dept: PHYSICAL THERAPY | Facility: HOSPITAL | Age: 71
End: 2021-03-22
Attending: ORTHOPAEDIC SURGERY
Payer: MEDICARE

## 2021-03-22 PROCEDURE — 97140 MANUAL THERAPY 1/> REGIONS: CPT

## 2021-03-22 PROCEDURE — 97110 THERAPEUTIC EXERCISES: CPT

## 2021-03-22 NOTE — PROGRESS NOTES
Dx: Pain of right heel (M79.671)  Right knee pain, unspecified chronicity (M25.561)  Primary localized osteoarthrosis of right lower leg (M17.11)  Gait abnormality (R26.9)  Plantar fasciitis of right foot (M72.2)         Authorized # of Visits:  Medicare, Date: 3/3/2021   Tx#: 6/ Date: 3/8/2021   Tx#: 7/ Date: 3/10/2021   Tx#: 8/ Date: 3/22/2021   Tx#: 9 Date:    Tx#:   Ther Ex:    Sitting-    Heel raises x20    Toe raises x10    Windshield wiper x20     marbles x4 min    Articulating arch 10 sec x10 ea    Rhythmic stabilization x1 min    KT R plantar fascia Ther Ex:     NuStep 5min L5     BB DF stretch 20 sec x3    BB center x1'    BB F/B x20    Heel raises x10    PROM R ankle    MRE's R ankle x10 ea    Rhythmic stabilization x1 min    Shuttle-  Heel eversion mobes gr III multiple bouts  eversion mobes gr III multiple bouts Manual therapy:    STM/GIASTM R plantar fascia/  gastroc    Jt sonu-  Talocrural joint AP, PA gr II-III  multiple bouts     Calcaneal eversion mobes gr III multiple bouts  eversion

## 2021-03-25 ENCOUNTER — OFFICE VISIT (OUTPATIENT)
Dept: PHYSICAL THERAPY | Facility: HOSPITAL | Age: 71
End: 2021-03-25
Attending: ORTHOPAEDIC SURGERY
Payer: MEDICARE

## 2021-03-25 DIAGNOSIS — M79.671 PAIN OF RIGHT HEEL: ICD-10-CM

## 2021-03-25 PROCEDURE — 97035 APP MDLTY 1+ULTRASOUND EA 15: CPT

## 2021-03-25 PROCEDURE — 97140 MANUAL THERAPY 1/> REGIONS: CPT

## 2021-03-25 PROCEDURE — 97110 THERAPEUTIC EXERCISES: CPT

## 2021-03-25 NOTE — PROGRESS NOTES
Progress Summary  Pt has attended 10 visits in Physical Therapy.   Dx: Pain of right heel (M79.671)  Right knee pain, unspecified chronicity (M25.561)  Primary localized osteoarthrosis of right lower leg (M17.11)  Gait abnormality (R26.9)  Plantar fasciiti today that may have been due to increased activity. Resolution of pain end of session as pt leaving clinic. Reviewed suggestions for improved arch support with ambulation throughout the day. Goals partially met. Pt motivated to continue HEP.   Added: US for signed by therapist: Pati Allen PT     Physician's certification required:  Yes  Please co-sign or sign and return this letter via fax as soon as possible to [phone].    I certify the need for these services furnished under this plan of treatme x10    Sitting-    Heel raises x20    Toe raises x10    Windshield wiper x20    Articulating arch 10 sec x10     PROM R ankle    MRE's R ankle x10 ea    Isometric R ankle inv/ev, pf/ev 5 sec x10 ea Ther Ex:     NuStep 5min L5     BB DF stretch 20 sec x3 II-III  multiple bouts     Calcaneal eversion mobes gr III multiple bouts  eversion mobes gr III multiple bouts     US 1.5w/cm2 7 min, cont R calcaneus     Manual therapy:    STM/GIASTM R plantar fascia/  gastroc    Jt sonu-  Talocrural joint AP, PA gr II

## 2021-03-29 ENCOUNTER — OFFICE VISIT (OUTPATIENT)
Dept: PHYSICAL THERAPY | Facility: HOSPITAL | Age: 71
End: 2021-03-29
Attending: ORTHOPAEDIC SURGERY
Payer: MEDICARE

## 2021-03-29 PROCEDURE — 97110 THERAPEUTIC EXERCISES: CPT

## 2021-03-29 PROCEDURE — 97035 APP MDLTY 1+ULTRASOUND EA 15: CPT

## 2021-03-29 PROCEDURE — 97140 MANUAL THERAPY 1/> REGIONS: CPT

## 2021-03-29 NOTE — PROGRESS NOTES
Right knee pain, unspecified chronicity (M25.561)  Primary localized osteoarthrosis of right lower leg (M17.11)  Gait abnormality (R26.9)  Plantar fasciitis of right foot (M72.2)         Authorized # of Visits:  Medicare, POC 12         Next MD visit: none proper foot clearance during gait and greater ease descending stairs without compensation-PROGRESSING  · Pt will have increased functional ankle strength to single leg heel lift x20 throughout for improved ankle control with ADLs such as prolonged gait and ankle inv/ev, pf/ev 5 sec x10 ea    KT-plantar   fascia Ther Ex:     BB DF stretch 20 sec x3    BB center x1'    BB F/B x20    Sitting-    Heel raises x20    Toe raises x10    Windshield wiper x20     marbles x4 min    Articulating arch 10 sec x10 center x1'    BB F/B x20    Heel raises 2x10    Marbles-pickup w toes x3 min    Alt ball touch x1'    PROM R ankle    MRE's R ankle x10 ea    Rhythmic stabilization x1 min    BW lunge x15       Ther Ex:     Progress note     NuStep 5min L5     Reviewed HEP plantar fascia/  gastroc    Jt mobes-  Talocrural joint AP, PA gr II-III  Multiple  bouts     Calcaneal eversion mobes gr III multiple bouts  eversion mobes gr III multiple bouts Manual therapy:    STM/GIASTM R plantar fascia/  gastroc    Jt mobes-  Talocr

## 2021-03-31 ENCOUNTER — OFFICE VISIT (OUTPATIENT)
Dept: PHYSICAL THERAPY | Facility: HOSPITAL | Age: 71
End: 2021-03-31
Attending: ORTHOPAEDIC SURGERY
Payer: MEDICARE

## 2021-03-31 PROCEDURE — 97140 MANUAL THERAPY 1/> REGIONS: CPT

## 2021-03-31 PROCEDURE — 97110 THERAPEUTIC EXERCISES: CPT

## 2021-03-31 PROCEDURE — 97035 APP MDLTY 1+ULTRASOUND EA 15: CPT

## 2021-03-31 NOTE — PROGRESS NOTES
Right knee pain, unspecified chronicity (M25.561)  Primary localized osteoarthrosis of right lower leg (M17.11)  Gait abnormality (R26.9)  Plantar fasciitis of right foot (M72.2)         Authorized # of Visits:  Medicare, POC 12         Next MD visit: none 12 visits)-progressing  · Pt will demonstrate improved DF AROM to >= 10 degrees to promote proper foot clearance during gait and greater ease descending stairs without compensation-PROGRESSING  · Pt will have increased functional ankle strength to single l wiper x20     marbles x4 min    Articulating arch 10 sec x10     PROM R ankle    MRE's R ankle x10 ea dir    Isometric R ankle inv/ev, pf/ev 5 sec x10 ea    KT-plantar   fascia Ther Ex:     BB DF stretch 20 sec x3    BB center x1'    BB F/B x20    S tape    Offered suggestions of HEP when on vacation     Ther Ex:     NuStep 5min L5     1/2 foam roller DF stretch 20 sec x3    BB center x1'    BB F/B x20    Heel raises 2x10    Marbles-pickup w toes x3 min    Alt ball touch x1'    PROM R ankle    MRE's R II-III  multiple bouts     Calcaneal eversion mobes gr III multiple bouts  eversion mobes gr III multiple bouts Manual therapy:    STM/GIASTM R plantar fascia/  gastroc    Jt sonu-  Talocrural joint AP, PA gr II-III  Multiple  bouts     Calcaneal eversion fascia 1.5 w/cm2 7 min                Charges: Zoie, MTx , US  Total Timed Treatment: 35 min     Total Treatment Time: 45 min

## 2021-04-05 ENCOUNTER — OFFICE VISIT (OUTPATIENT)
Dept: PHYSICAL THERAPY | Facility: HOSPITAL | Age: 71
End: 2021-04-05
Attending: FAMILY MEDICINE
Payer: MEDICARE

## 2021-04-05 PROCEDURE — 97035 APP MDLTY 1+ULTRASOUND EA 15: CPT

## 2021-04-05 PROCEDURE — 97110 THERAPEUTIC EXERCISES: CPT

## 2021-04-05 PROCEDURE — 97140 MANUAL THERAPY 1/> REGIONS: CPT

## 2021-04-05 NOTE — PROGRESS NOTES
Right knee pain, unspecified chronicity (M25.561)  Primary localized osteoarthrosis of right lower leg (M17.11)  Gait abnormality (R26.9)  Plantar fasciitis of right foot (M72.2)         Authorized # of Visits:  Medicare, POC 12         Next MD visit: none stance and push off.      Goals:   Goals: (to be met in 12 visits)-progressing  · Pt will demonstrate improved DF AROM to >= 10 degrees to promote proper foot clearance during gait and greater ease descending stairs without compensation-PROGRESSING  · Pt wi x20      Heel raises x20    Toe raises x10    Windshield wiper x20     marbles x4 min    Articulating arch 10 sec x10     PROM R ankle    MRE's R ankle x10 ea dir    Isometric R ankle inv/ev, pf/ev 5 sec x10 ea    KT-plantar   fascia Ther Ex:     BB to lateral calcaneus-  Instructed in ay to remove tape    Offered suggestions of HEP when on vacation     Ther Ex:     NuStep 5min L5     1/2 foam roller DF stretch 20 sec x3    BB center x1'    BB F/B x20    Heel raises 2x10    Marbles-pickup w toes x3 mi sonu-  Talocrural joint AP, PA gr II-III  multiple bouts     Calcaneal eversion mobes gr III multiple bouts  eversion mobes gr III multiple bouts Manual therapy:    STM/GIASTM R plantar fascia/  gastroc    Jt sonu-  Talocrural joint AP, PA gr II-III  Mul stretching multiple bouts     Modality:  US R plantar fascia 1.5 w/cm2 7 min     Manual therapy:     STM/GIASTM R plantar fascia/  gastroc    Jt sonu-  Talocrural joint AP, PA gr II-III  Multiple  bouts     Calcaneal eversion mobes gr III multiple  bouts

## 2021-04-07 ENCOUNTER — TELEPHONE (OUTPATIENT)
Dept: PHYSICAL THERAPY | Facility: HOSPITAL | Age: 71
End: 2021-04-07

## 2021-04-07 ENCOUNTER — OFFICE VISIT (OUTPATIENT)
Dept: PHYSICAL THERAPY | Facility: HOSPITAL | Age: 71
End: 2021-04-07
Attending: FAMILY MEDICINE
Payer: MEDICARE

## 2021-04-07 PROCEDURE — 97110 THERAPEUTIC EXERCISES: CPT

## 2021-04-07 PROCEDURE — 97140 MANUAL THERAPY 1/> REGIONS: CPT

## 2021-04-07 NOTE — PROGRESS NOTES
Right knee pain, unspecified chronicity (M25.561)  Primary localized osteoarthrosis of right lower leg (M17.11)  Gait abnormality (R26.9)  Plantar fasciitis of right foot (M72.2)         Authorized # of Visits:  Medicare, POC 12         Next MD visit: none during gait and greater ease descending stairs without compensation-PROGRESSING  · Pt will have increased functional ankle strength to single leg heel lift x20 throughout for improved ankle control with ADLs such as prolonged gait and stair negotiation-PRO x10 ea    KT-plantar   fascia Ther Ex:     BB DF stretch 20 sec x3    BB center x1'    BB F/B x20    Sitting-    Heel raises x20    Toe raises x10    Windshield wiper x20     marbles x4 min    Articulating arch 10 sec x10     PROM R ankle    MRE's R x20    Heel raises 2x10    Marbles-pickup w toes x3 min    Alt ball touch x1'    PROM R ankle    MRE's R ankle x10 ea    Rhythmic stabilization x1 min    BW lunge x15       Ther Ex:     Progress note     NuStep 5min L5     Reviewed HEP    Heel raises 2x10 fascia/  gastroc    Jt mobmichael-  Talocrural joint AP, PA gr II-III  Multiple  bouts     Calcaneal eversion mobes gr III multiple bouts  eversion mobes gr III multiple bouts Manual therapy:    STM/GIJENS R plantar fascia/  gastroc    Jt mobmichael-  Talocrural gustabo pronation    leukotape-plantar fascial arch      Manual therapy:     STM/GIASTM R plantar fascia/  gastroc    Jt mobes-  Talocrural joint AP, PA gr II-III  Multiple  bouts     Calcaneal eversion mobes gr III multiple bouts  eversion mobes gr III multiple b

## 2021-04-15 ENCOUNTER — OFFICE VISIT (OUTPATIENT)
Dept: PHYSICAL THERAPY | Facility: HOSPITAL | Age: 71
End: 2021-04-15
Attending: ORTHOPAEDIC SURGERY
Payer: MEDICARE

## 2021-04-15 PROCEDURE — 97110 THERAPEUTIC EXERCISES: CPT

## 2021-04-15 PROCEDURE — 97140 MANUAL THERAPY 1/> REGIONS: CPT

## 2021-04-15 NOTE — PROGRESS NOTES
Right knee pain, unspecified chronicity (M25.561)  Primary localized osteoarthrosis of right lower leg (M17.11)  Gait abnormality (R26.9)  Plantar fasciitis of right foot (M72.2)         Authorized # of Visits:  Medicare, POC 12         Next MD visit: none increased functional ankle strength to single leg heel lift x20 throughout for improved ankle control with ADLs such as prolonged gait and stair negotiation-PROGRESSING  · Pt will have increased B hip strength to 5-/5 to assist with squatting and stair neg x20    Sitting-    Heel raises x20    Toe raises x10    Windshield wiper x20     marbles x4 min    Articulating arch 10 sec x10     PROM R ankle    MRE's R ankle x10 ea dir    Isometric R ankle inv/ev, pf/ev 5 sec x10 ea    KT-plantar fascia Ther Ex ankle x10 ea    Rhythmic stabilization x1 min    BW lunge x15       Ther Ex:     Progress note     NuStep 5min L5     Reviewed HEP    Heel raises 2x10    Marbles-pickup w toes x3 min      PROM R ankle    MRE's R ankle x10 ea    Rhythmic stabilization x1 mi multiple bouts  eversion mobes gr III multiple bouts Manual therapy:    STM/GIASTM R plantar fascia/  gastroc    Jt mobmichael-  Talocrural joint AP, PA gr II-III  Multiple  bouts     Calcaneal eversion mobes gr III multiple bouts  eversion mobes gr III multipl nn/hamstring stretch on/off x20    DF, PF, INBV, EV BTB x20 ea    PROM R ankle    Standing gastroc  stretch 1/2 foam roller  30 sec x3    Heel raises x10    Functional squat holding onto bar 2x10    BW lunge L/R 2x10    Gait training    SLS   Ball catch/

## 2021-04-19 ENCOUNTER — TELEPHONE (OUTPATIENT)
Dept: PHYSICAL THERAPY | Facility: HOSPITAL | Age: 71
End: 2021-04-19

## 2021-04-19 ENCOUNTER — APPOINTMENT (OUTPATIENT)
Dept: PHYSICAL THERAPY | Facility: HOSPITAL | Age: 71
End: 2021-04-19
Attending: FAMILY MEDICINE
Payer: MEDICARE

## 2021-04-21 ENCOUNTER — APPOINTMENT (OUTPATIENT)
Dept: PHYSICAL THERAPY | Facility: HOSPITAL | Age: 71
End: 2021-04-21
Attending: ORTHOPAEDIC SURGERY
Payer: MEDICARE

## 2021-04-28 ENCOUNTER — OFFICE VISIT (OUTPATIENT)
Dept: PHYSICAL THERAPY | Facility: HOSPITAL | Age: 71
End: 2021-04-28
Attending: ORTHOPAEDIC SURGERY
Payer: MEDICARE

## 2021-04-28 PROCEDURE — 97035 APP MDLTY 1+ULTRASOUND EA 15: CPT

## 2021-04-28 PROCEDURE — 97110 THERAPEUTIC EXERCISES: CPT

## 2021-04-28 PROCEDURE — 97140 MANUAL THERAPY 1/> REGIONS: CPT

## 2021-04-28 NOTE — PROGRESS NOTES
Right knee pain, unspecified chronicity (M25.561)  Primary localized osteoarthrosis of right lower leg (M17.11)  Gait abnormality (R26.9)  Plantar fasciitis of right foot (M72.2)         Authorized # of Visits:  Medicare, POC 12         Next MD visit: none visits)-progressing  · Pt will demonstrate improved DF AROM to >= 10 degrees to promote proper foot clearance during gait and greater ease descending stairs without compensation-PROGRESSING  · Pt will have increased functional ankle strength to single leg min    Articulating arch 10 sec x10     PROM R ankle    MRE's R ankle x10 ea dir    Isometric R ankle inv/ev, pf/ev 5 sec x10 ea    KT-plantar   fascia Ther Ex:     BB DF stretch 20 sec x3    BB center x1'    BB F/B x20    Sitting-    Heel raises x20    To on vacation     Ther Ex:     NuStep 5min L5     1/2 foam roller DF stretch 20 sec x3    BB center x1'    BB F/B x20    Heel raises 2x10    Marbles-pickup w toes x3 min    Alt ball touch x1'    PROM R ankle    MRE's R ankle x10 ea    Rhythmic stabilization III multiple bouts  eversion mobes gr III multiple bouts Manual therapy:    STM/GIASTM R plantar fascia/  gastroc    Jt mobmichael-  Talocrural joint AP, PA gr II-III  Multiple  bouts     Calcaneal eversion mobes gr III multiple bouts  eversion mobes gr III mul 2x10    Gait training    SLS x30 sec x2  -HEP SLS w activities of daily living    KT-gastroc, plantar fascia-anti pronation    leukotape-plantar fascial arch Ther Ex:     NuStep 5min L5     Hamstring stretch 1 min    Sciatic nn/hamstring stretch on/off x20 plantar fascia/  gastroc    Jt mobes-  Talocrural joint AP, PA gr II-III  Multiple  bouts     Calcaneal eversion  mobes gr III multiple  bouts  Eversion  mobes gr III multiple bouts    Metatarsal head-AP, PA,  Inv/ev stretching multiple bouts      Manual t

## 2021-05-03 ENCOUNTER — OFFICE VISIT (OUTPATIENT)
Dept: PHYSICAL THERAPY | Facility: HOSPITAL | Age: 71
End: 2021-05-03
Attending: ORTHOPAEDIC SURGERY
Payer: MEDICARE

## 2021-05-03 PROCEDURE — 97035 APP MDLTY 1+ULTRASOUND EA 15: CPT

## 2021-05-03 PROCEDURE — 97110 THERAPEUTIC EXERCISES: CPT

## 2021-05-03 PROCEDURE — 97140 MANUAL THERAPY 1/> REGIONS: CPT

## 2021-05-03 NOTE — PROGRESS NOTES
Right knee pain, unspecified chronicity (M25.561)  Primary localized osteoarthrosis of right lower leg (M17.11)  Gait abnormality (R26.9)  Plantar fasciitis of right foot (M72.2)         Authorized # of Visits:  Medicare, POC 12         Next MD visit: none clearance during gait and greater ease descending stairs without compensation-PROGRESSING  · Pt will have increased functional ankle strength to single leg heel lift x20 throughout for improved ankle control with ADLs such as prolonged gait and stair negot BB DF stretch 20 sec x3    BB center x1'    BB F/B x20    Sitting-    Heel raises x20    Toe raises x10    Windshield wiper x20     marbles x4 min    Articulating arch 10 sec x10     PROM R ankle    MRE's R ankle x10 ea dir    Isometric R ankle x3 min    Alt ball touch x1'    PROM R ankle    MRE's R ankle x10 ea    Rhythmic stabilization x1 min    BW lunge x15       Ther Ex:     Progress note     NuStep 5min L5     Reviewed HEP    Heel raises 2x10    Marbles-pickup w toes x3 min      PROM R ankle II-III  Multiple  bouts     Calcaneal eversion mobes gr III multiple bouts  eversion mobes gr III multiple bouts Manual therapy:    STM/GIASTM R plantar fascia/  gastroc    Jt sonu-  Talocrural joint AP, PA gr II-III  Multiple  bouts     Calcaneal eversio pronation    leukotape-plantar fascial arch Ther Ex:     NuStep 5min L5     Hamstring stretch 1 min    Sciatic nn/hamstring stretch on/off x20    DF, PF, INBV, EV BTB x20 ea    PROM R ankle    Standing gastroc  stretch 1/2 foam roller  30 sec x3    Heel ra R plantar fascia/  gastroc    Jt mobes-  Talocrural joint AP, PA gr II-III  Multiple  bouts     Calcaneal eversion  mobes gr III multiple  bouts  eversion mobes gr III multiple bouts    Metatarsal head-AP, PA,  Inv/ev stretching multiple bouts      Manual

## 2021-05-05 ENCOUNTER — OFFICE VISIT (OUTPATIENT)
Dept: PHYSICAL THERAPY | Facility: HOSPITAL | Age: 71
End: 2021-05-05
Attending: ORTHOPAEDIC SURGERY
Payer: MEDICARE

## 2021-05-05 PROCEDURE — 97140 MANUAL THERAPY 1/> REGIONS: CPT

## 2021-05-05 PROCEDURE — 97110 THERAPEUTIC EXERCISES: CPT

## 2021-05-05 PROCEDURE — 97035 APP MDLTY 1+ULTRASOUND EA 15: CPT

## 2021-05-05 NOTE — PROGRESS NOTES
Right knee pain, unspecified chronicity (M25.561)  Primary localized osteoarthrosis of right lower leg (M17.11)  Gait abnormality (R26.9)  Plantar fasciitis of right foot (M72.2)         Authorized # of Visits:  Medicare, POC 12         Next MD visit: none descending stairs without compensation-MET  · Pt will have increased functional ankle strength to single leg heel lift x20 throughout for improved ankle control with ADLs such as prolonged gait and stair negotiation-PROGRESSING  · Pt will have increased B x1'    BB F/B x20    Sitting-    Heel raises x20    Toe raises x10    Windshield wiper x20     marbles x4 min    Articulating arch 10 sec x10     PROM R ankle    MRE's R ankle x10 ea dir    Isometric R ankle inv/ev, pf/ev 5 sec x10 ea    KT-plantar ankle    MRE's R ankle x10 ea    Rhythmic stabilization x1 min    BW lunge x15       Ther Ex:     Progress note     NuStep 5min L5     Reviewed HEP    Heel raises 2x10    Marbles-pickup w toes x3 min      PROM R ankle    MRE's R ankle x10 ea    Rhythmic st eversion mobes gr III multiple bouts  eversion mobes gr III multiple bouts Manual therapy:    STM/GIASTM R plantar fascia/  gastroc    Jt sonu-  Talocrural joint AP, PA gr II-III  Multiple  bouts     Calcaneal eversion mobes gr III multiple bouts  eversio pronation    leukotape-plantar fascial arch Ther Ex:     NuStep 5min L5     Hamstring stretch 1 min    Sciatic nn/hamstring stretch on/off x20    DF, PF, INBV, EV BTB x20 ea    PROM R ankle    Standing gastroc  stretch 1/2 foam roller  30 sec x3    Heel ra bouts     Modality:  US R plantar fascia 1.5 w/cm2 7 min     Manual therapy:     STM/GIASTM R plantar fascia/  gastroc    Jt mobes-  Talocrural joint AP, PA gr II-III  Multiple  bouts     Calcaneal eversion mobes gr III multiple  bouts  eversion mobes gr I sonu-  Talocrural joint AP, PA gr II-III  Multiple  bouts     Calcaneal eversion  mobes gr III multiple  bouts  Eversion  mobes gr III multiple bouts    Metatarsal head-AP, PA,  Inv/ev stretching multiple bouts     Modality:  US RODRIGUEZ plantar fascia 1.5 w/cm2

## 2021-05-10 ENCOUNTER — OFFICE VISIT (OUTPATIENT)
Dept: PHYSICAL THERAPY | Facility: HOSPITAL | Age: 71
End: 2021-05-10
Attending: ORTHOPAEDIC SURGERY
Payer: MEDICARE

## 2021-05-10 PROCEDURE — 97110 THERAPEUTIC EXERCISES: CPT

## 2021-05-10 PROCEDURE — 97140 MANUAL THERAPY 1/> REGIONS: CPT

## 2021-05-10 NOTE — PROGRESS NOTES
Right knee pain, unspecified chronicity (M25.561)  Primary localized osteoarthrosis of right lower leg (M17.11)  Gait abnormality (R26.9)  Plantar fasciitis of right foot (M72.2)         Authorized # of Visits:  Medicare, POC 12         Next MD visit: none and greater ease descending stairs without compensation-MET  · Pt will have increased functional ankle strength to single leg heel lift x20 throughout for improved ankle control with ADLs such as prolonged gait and stair negotiation-PROGRESSING  · Pt will DF stretch 20 sec x3    BB center x1'    BB F/B x20    Sitting-    Heel raises x20    Toe raises x10    Windshield wiper x20     marbles x4 min    Articulating arch 10 sec x10     PROM R ankle    MRE's R ankle x10 ea dir    Isometric R ankle inv/ev, min    Alt ball touch x1'    PROM R ankle    MRE's R ankle x10 ea    Rhythmic stabilization x1 min    BW lunge x15       Ther Ex:     Progress note     NuStep 5min L5     Reviewed HEP    Heel raises 2x10    Marbles-pickup w toes x3 min      PROM R ankle II-III  Multiple  bouts     Calcaneal eversion mobes gr III multiple bouts  eversion mobes gr III multiple bouts Manual therapy:    STM/GIASTM R plantar fascia/  gastroc    Jt sonu-  Talocrural joint AP, PA gr II-III  Multiple  bouts     Calcaneal eversio living    KT-gastroc, plantar fascia-anti pronation    leukotape-plantar fascial arch Ther Ex:     NuStep 5min L5     Hamstring stretch 1 min    Sciatic nn/hamstring stretch on/off x20    DF, PF, INBV, EV BTB x20 ea    PROM R ankle    Standing gastroc  str rebounder 2# x10    Functional squat holding onto the bar x20    Supine-  Hamstring stretch on/off x30    Hamstring stretch 30 sec x3    Shuttle  DLP 62#    20 reps     Heel raises x20    Functional squat holding onto bar x10    Gait training    KT-gastroc II-III  Multiple  bouts     Calcaneal eversion  mobes gr III multiple  bouts  Eversion  mobes gr III multiple bouts    Metatarsal head-AP, PA,  Inv/ev stretching multiple bouts     Modality:  US RODRIGUEZ plantar fascia 1.5 w/cm2 7 min Manual therapy:     MARILEE/NINI

## 2021-05-13 ENCOUNTER — APPOINTMENT (OUTPATIENT)
Dept: PHYSICAL THERAPY | Facility: HOSPITAL | Age: 71
End: 2021-05-13
Attending: ORTHOPAEDIC SURGERY
Payer: MEDICARE

## 2021-05-17 ENCOUNTER — OFFICE VISIT (OUTPATIENT)
Dept: PHYSICAL THERAPY | Facility: HOSPITAL | Age: 71
End: 2021-05-17
Attending: ORTHOPAEDIC SURGERY
Payer: MEDICARE

## 2021-05-17 PROCEDURE — 97140 MANUAL THERAPY 1/> REGIONS: CPT

## 2021-05-17 PROCEDURE — 97110 THERAPEUTIC EXERCISES: CPT

## 2021-05-17 NOTE — PROGRESS NOTES
Discharge Summary  Pt has attended 20 visits in Physical Therapy.   Right knee pain, unspecified chronicity (M25.561)  Primary localized osteoarthrosis of right lower leg (M17.11)  Gait abnormality (R26.9)  Plantar fasciitis of right foot (M72.2)         A Goals: (to be met in 12+8 visits)-  · Pt will demonstrate improved DF AROM to >= 10 degrees to promote proper foot clearance during gait and greater ease descending stairs without compensation-MET  · Pt will have increased functional ankle strength to si x20     marbles x4 min    Articulating arch 10 sec x10     PROM R ankle    MRE's R ankle x10 ea dir    Isometric R ankle inv/ev, pf/ev 5 sec x10 ea    KT-plantar   fascia Ther Ex:     BB DF stretch 20 sec x3    BB center x1'    BB F/B x20    Sitting tape    Offered suggestions of HEP when on vacation     Ther Ex:     NuStep 5min L5     1/2 foam roller DF stretch 20 sec x3    BB center x1'    BB F/B x20    Heel raises 2x10    Marbles-pickup w toes x3 min    Alt ball touch x1'    PROM R ankle    MRE's R II-III  multiple bouts     Calcaneal eversion mobes gr III multiple bouts  eversion mobes gr III multiple bouts Manual therapy:    STM/GIASTM R plantar fascia/  gastroc    Jt sonu-  Talocrural joint AP, PA gr II-III  Multiple  bouts     Calcaneal eversion ankle    Standing gastroc  stretch 1/2 foam roller  30 sec x3    Heel raises x10    Functional squat holding onto bar 2x10    BW lunge L/R 2x10    Gait training    SLS x30 sec x2  -HEP SLS w activities of daily living    KT-gastroc, plantar fascia-anti pro 5min L5     Hamstring stretch 1 min    Sciatic nn/hamstring stretch on/off x20    PROM R ankle    Standing gastroc  stretch 1/2 foam roller  30 sec x3    BB F/B x20    BB center x1'    ASU 6 in x10    SLS rebounder 2# x10    Functional squat holding onto t head-AP, PA,  Inv/ev stretching multiple bouts      Manual therapy:     STM/GIASTM R plantar fascia/  gastroc    Jt mobes-  Talocrural joint AP, PA gr II-III  Multiple  bouts     Calcaneal eversion  mobes gr III multiple  bouts  Eversion  mobes gr III mult marielaes-  Talocrural joint AP, PA gr II-III  Multiple  bouts     Calcaneal eversion  mobes gr III multiple  bouts  Eversion  mobes gr III multiple bouts    Metatarsal head-AP, PA,  Inv/ev stretching multiple bouts            Charges: TEx2, MT  Total Timed Tr

## 2021-05-20 ENCOUNTER — APPOINTMENT (OUTPATIENT)
Dept: PHYSICAL THERAPY | Facility: HOSPITAL | Age: 71
End: 2021-05-20
Attending: ORTHOPAEDIC SURGERY
Payer: MEDICARE

## 2021-06-07 ENCOUNTER — LAB ENCOUNTER (OUTPATIENT)
Dept: LAB | Age: 71
End: 2021-06-07
Attending: FAMILY MEDICINE
Payer: MEDICARE

## 2021-06-07 DIAGNOSIS — E78.00 PURE HYPERCHOLESTEROLEMIA: ICD-10-CM

## 2021-06-07 PROCEDURE — 36415 COLL VENOUS BLD VENIPUNCTURE: CPT

## 2021-06-07 PROCEDURE — 80053 COMPREHEN METABOLIC PANEL: CPT

## 2021-06-07 PROCEDURE — 80061 LIPID PANEL: CPT

## 2021-06-07 NOTE — PROGRESS NOTES
[facility] Office Note  Chief Complaint:   Patient presents with:  New Patient: 6 month       HPI:   This is a 80year old female coming in for Depression, HTN, hyperlipidemia and OA. 1.  Depression - stable.   Patient has been 2/6/2015    right reverse shoulder replacement   • CATARACT EXTRACTION W/  INTRAOCULAR LENS IMPLANT Right 01/2020   • CATARACT EXTRACTION W/  INTRAOCULAR LENS IMPLANT Left 02/2020   • COLONOSCOPY  3/8/2005    normal   • COLONOSCOPY  2/5/2009    normal   • Disease Sister         on O2   • Heart Attack Sister    • Stroke Sister         nursing home   • Substance Abuse Brother    • Breast Cancer Sister 52        47   • Obesity Sister    • Neurological Disorder Sister         MS   • BRCA gene + Sister    • Diab vomiting  NEUROLOGICAL:  Denies headache, dizziness, syncope, numbness or tingling in the extremities. MUSCULOSKELETAL:  Denies muscle, back pain, joint pain or stiffness.     EXAM:   /80   Pulse 81   Temp 97.7 °F (36.5 °C)   Resp 16   Ht 5' 7\" (1.7 Prescriptions     Signed Prescriptions Disp Refills   • Citalopram Hydrobromide 20 MG Oral Tab 90 tablet 1     Sig: TAKE 1 TABLET BY MOUTH EVERY DAY   • losartan Potassium 50 MG Oral Tab 90 tablet 1     Sig: TAKE 1 TABLET(50 MG) BY MOUTH DAILY   • [place]

## 2021-12-08 ENCOUNTER — OFFICE VISIT (OUTPATIENT)
Dept: FAMILY MEDICINE CLINIC | Facility: CLINIC | Age: 71
End: 2021-12-08
Payer: MEDICARE

## 2021-12-08 ENCOUNTER — LAB ENCOUNTER (OUTPATIENT)
Dept: LAB | Age: 71
End: 2021-12-08
Attending: FAMILY MEDICINE
Payer: MEDICARE

## 2021-12-08 VITALS
HEIGHT: 66 IN | RESPIRATION RATE: 16 BRPM | DIASTOLIC BLOOD PRESSURE: 84 MMHG | TEMPERATURE: 98 F | BODY MASS INDEX: 31.82 KG/M2 | HEART RATE: 76 BPM | SYSTOLIC BLOOD PRESSURE: 132 MMHG | WEIGHT: 198 LBS

## 2021-12-08 DIAGNOSIS — M15.9 PRIMARY OSTEOARTHRITIS INVOLVING MULTIPLE JOINTS: ICD-10-CM

## 2021-12-08 DIAGNOSIS — I10 ESSENTIAL HYPERTENSION, BENIGN: Primary | ICD-10-CM

## 2021-12-08 DIAGNOSIS — F34.1 CHRONIC DEPRESSIVE PERSONALITY DISORDER: ICD-10-CM

## 2021-12-08 DIAGNOSIS — E78.2 MIXED HYPERLIPIDEMIA: ICD-10-CM

## 2021-12-08 DIAGNOSIS — I10 ESSENTIAL HYPERTENSION, BENIGN: ICD-10-CM

## 2021-12-08 PROBLEM — N18.31 CHRONIC KIDNEY DISEASE, STAGE 3A (HCC): Status: ACTIVE | Noted: 2021-12-08

## 2021-12-08 PROBLEM — M15.0 PRIMARY OSTEOARTHRITIS INVOLVING MULTIPLE JOINTS: Status: ACTIVE | Noted: 2021-12-08

## 2021-12-08 PROCEDURE — 99214 OFFICE O/P EST MOD 30 MIN: CPT | Performed by: FAMILY MEDICINE

## 2021-12-08 PROCEDURE — 80053 COMPREHEN METABOLIC PANEL: CPT

## 2021-12-08 PROCEDURE — 80061 LIPID PANEL: CPT

## 2021-12-08 RX ORDER — LOSARTAN POTASSIUM 50 MG/1
TABLET ORAL
Qty: 90 TABLET | Refills: 1 | Status: SHIPPED | OUTPATIENT
Start: 2021-12-08

## 2021-12-08 RX ORDER — CITALOPRAM 20 MG/1
TABLET ORAL
Qty: 90 TABLET | Refills: 1 | Status: SHIPPED | OUTPATIENT
Start: 2021-12-08

## 2021-12-08 RX ORDER — NABUMETONE 500 MG/1
500 TABLET, FILM COATED ORAL DAILY
Qty: 90 TABLET | Refills: 1 | Status: SHIPPED | OUTPATIENT
Start: 2021-12-08

## 2021-12-08 NOTE — PROGRESS NOTES
[facility] Office Note  Chief Complaint:   Patient presents with:  Medication Follow-Up      HPI:   This is a 80year old female coming in for follow up of:    1. HTN - Patient presents for recheck of her hypertension.  Pt has bee REPLACEMENT Right 2/6/2015    right reverse shoulder replacement   • CATARACT EXTRACTION W/  INTRAOCULAR LENS IMPLANT Right 01/2020   • CATARACT EXTRACTION W/  INTRAOCULAR LENS IMPLANT Left 02/2020   • COLONOSCOPY  3/8/2005    normal   • COLONOSCOPY  2/5/2 MRSA   • Pulmonary Disease Sister         on O2   • Heart Attack Sister    • Stroke Sister         nursing home   • Substance Abuse Brother    • Breast Cancer Sister 52        47   • Obesity Sister    • Neurological Disorder Sister         MS   • BRCA gene dizziness, syncope, numbness or tingling in the extremities.   MUSCULOSKELETAL:  + multiple joint pains    EXAM:   /84 (BP Location: Right arm, Patient Position: Sitting, Cuff Size: adult)   Pulse 76   Temp 97.9 °F (36.6 °C) (Oral)   Resp 16   Ht 5' 6 Dispense: 90 tablet;  Refill: 1      Meds & Refills for this Visit:  Requested Prescriptions     Signed Prescriptions Disp Refills   • citalopram 20 MG Oral Tab 90 tablet 1     Sig: TAKE 1 TABLET BY MOUTH EVERY DAY   • losartan 50 MG Oral Tab 90 tablet 1 occasionally words are mis-transcribed.

## 2022-06-08 ENCOUNTER — OFFICE VISIT (OUTPATIENT)
Dept: FAMILY MEDICINE CLINIC | Facility: CLINIC | Age: 72
End: 2022-06-08
Payer: MEDICARE

## 2022-06-08 ENCOUNTER — LAB ENCOUNTER (OUTPATIENT)
Dept: LAB | Age: 72
End: 2022-06-08
Attending: FAMILY MEDICINE
Payer: MEDICARE

## 2022-06-08 VITALS
DIASTOLIC BLOOD PRESSURE: 84 MMHG | BODY MASS INDEX: 32.14 KG/M2 | SYSTOLIC BLOOD PRESSURE: 128 MMHG | HEIGHT: 66 IN | HEART RATE: 76 BPM | TEMPERATURE: 98 F | RESPIRATION RATE: 16 BRPM | WEIGHT: 200 LBS

## 2022-06-08 DIAGNOSIS — Z12.31 ENCOUNTER FOR SCREENING MAMMOGRAM FOR MALIGNANT NEOPLASM OF BREAST: ICD-10-CM

## 2022-06-08 DIAGNOSIS — F34.1 CHRONIC DEPRESSIVE PERSONALITY DISORDER: ICD-10-CM

## 2022-06-08 DIAGNOSIS — Z12.11 SCREENING FOR MALIGNANT NEOPLASM OF COLON: ICD-10-CM

## 2022-06-08 DIAGNOSIS — E78.2 MIXED HYPERLIPIDEMIA: ICD-10-CM

## 2022-06-08 DIAGNOSIS — I10 ESSENTIAL HYPERTENSION, BENIGN: Primary | ICD-10-CM

## 2022-06-08 DIAGNOSIS — I10 ESSENTIAL HYPERTENSION, BENIGN: ICD-10-CM

## 2022-06-08 DIAGNOSIS — M15.9 PRIMARY OSTEOARTHRITIS INVOLVING MULTIPLE JOINTS: ICD-10-CM

## 2022-06-08 LAB
ALBUMIN SERPL-MCNC: 3.6 G/DL (ref 3.4–5)
ALBUMIN/GLOB SERPL: 1.1 {RATIO} (ref 1–2)
ALP LIVER SERPL-CCNC: 73 U/L
ALT SERPL-CCNC: 37 U/L
ANION GAP SERPL CALC-SCNC: 5 MMOL/L (ref 0–18)
AST SERPL-CCNC: 28 U/L (ref 15–37)
BILIRUB SERPL-MCNC: 0.4 MG/DL (ref 0.1–2)
BUN BLD-MCNC: 19 MG/DL (ref 7–18)
CALCIUM BLD-MCNC: 9.2 MG/DL (ref 8.5–10.1)
CHLORIDE SERPL-SCNC: 109 MMOL/L (ref 98–112)
CHOLEST SERPL-MCNC: 223 MG/DL (ref ?–200)
CO2 SERPL-SCNC: 27 MMOL/L (ref 21–32)
CREAT BLD-MCNC: 1.01 MG/DL
FASTING PATIENT LIPID ANSWER: YES
FASTING STATUS PATIENT QL REPORTED: YES
GLOBULIN PLAS-MCNC: 3.4 G/DL (ref 2.8–4.4)
GLUCOSE BLD-MCNC: 105 MG/DL (ref 70–99)
HDLC SERPL-MCNC: 68 MG/DL (ref 40–59)
LDLC SERPL CALC-MCNC: 132 MG/DL (ref ?–100)
NONHDLC SERPL-MCNC: 155 MG/DL (ref ?–130)
OSMOLALITY SERPL CALC.SUM OF ELEC: 295 MOSM/KG (ref 275–295)
POTASSIUM SERPL-SCNC: 4.6 MMOL/L (ref 3.5–5.1)
PROT SERPL-MCNC: 7 G/DL (ref 6.4–8.2)
SODIUM SERPL-SCNC: 141 MMOL/L (ref 136–145)
TRIGL SERPL-MCNC: 132 MG/DL (ref 30–149)
VLDLC SERPL CALC-MCNC: 24 MG/DL (ref 0–30)

## 2022-06-08 PROCEDURE — 80061 LIPID PANEL: CPT

## 2022-06-08 PROCEDURE — 80053 COMPREHEN METABOLIC PANEL: CPT

## 2022-06-08 PROCEDURE — 99214 OFFICE O/P EST MOD 30 MIN: CPT | Performed by: FAMILY MEDICINE

## 2022-06-08 RX ORDER — LOSARTAN POTASSIUM 50 MG/1
TABLET ORAL
Qty: 90 TABLET | Refills: 1 | Status: SHIPPED | OUTPATIENT
Start: 2022-06-08

## 2022-06-08 RX ORDER — NABUMETONE 500 MG/1
500 TABLET, FILM COATED ORAL DAILY
Qty: 90 TABLET | Refills: 1 | Status: SHIPPED | OUTPATIENT
Start: 2022-06-08

## 2022-06-08 RX ORDER — FEXOFENADINE HCL 180 MG/1
180 TABLET ORAL DAILY
COMMUNITY

## 2022-06-08 RX ORDER — CITALOPRAM 20 MG/1
TABLET ORAL
Qty: 90 TABLET | Refills: 1 | Status: SHIPPED | OUTPATIENT
Start: 2022-06-08

## 2022-06-14 ENCOUNTER — HOSPITAL ENCOUNTER (OUTPATIENT)
Dept: MAMMOGRAPHY | Age: 72
Discharge: HOME | End: 2022-06-14
Attending: FAMILY MEDICINE
Payer: MEDICARE

## 2022-06-14 DIAGNOSIS — Z12.31 ENCOUNTER FOR SCREENING MAMMOGRAM FOR MALIGNANT NEOPLASM OF BREAST: ICD-10-CM

## 2022-06-14 PROCEDURE — 77063 BREAST TOMOSYNTHESIS BI: CPT | Performed by: FAMILY MEDICINE

## 2022-06-14 PROCEDURE — 77067 SCR MAMMO BI INCL CAD: CPT | Performed by: FAMILY MEDICINE

## 2022-07-14 ENCOUNTER — ORDER TRANSCRIPTION (OUTPATIENT)
Dept: ADMINISTRATIVE | Facility: HOSPITAL | Age: 72
End: 2022-07-14

## 2022-07-14 DIAGNOSIS — Z13.6 SCREENING FOR CARDIOVASCULAR CONDITION: Primary | ICD-10-CM

## 2022-07-15 ENCOUNTER — HOSPITAL ENCOUNTER (OUTPATIENT)
Dept: CT IMAGING | Facility: HOSPITAL | Age: 72
Discharge: HOME | End: 2022-07-15
Attending: FAMILY MEDICINE

## 2022-07-15 DIAGNOSIS — Z13.6 SCREENING FOR CARDIOVASCULAR CONDITION: ICD-10-CM

## 2022-07-15 NOTE — PROGRESS NOTES
Date of Service 7/15/2022    Roberto Cole  Date of Birth 4/2/1950    Patient Age: 71year old    PCP: Monica Bennett,   [id number]  [facility] [address]    Consult Type  Type Scan/Screening: Heart Scan  Preliminary Heart Scan Score: 149.97 (Heart Scan 2/14/21  Calcium Score 99  Heart Scan 5/9/16  Calcium Score 28  Patient states that Dr. John Marr requested Heart Scan be repeated again.)                Body Mass Index  There is no height or weight on file to calculate BMI. Lipid Profile  Cholesterol: 223, done on 6/8/2022. HDL Cholesterol: 68, done on 6/8/2022. LDL Cholesterol: 132, done on 6/8/2022. TriGlycerides 132, done on 6/8/2022. Nurse Review  Risk factor information and results reviewed with Nurse: Yes    Recommended Follow Up:  Consult your physician regarding[de-identified] Final Heart Scan Report; Discuss potential for Incidental Finding; Cholesterol Results (Fasting)    No data recorded      Recommendations for Change:     Cholesterol Modification (goal of therapy depends upon your risk): Decrease LDL (Lousy/Bad) Ideal <100              Repeat Heart Scan: Discuss with your Physician          [place] Recommended Resources:  Recommended Resources: Upcoming Classes, Medical Services and [address] www. Health. Isabel Koroma RN        Please Contact the Nurse Heart Line with any Questions or Concerns [phone].

## 2022-07-20 DIAGNOSIS — E78.2 MIXED HYPERLIPIDEMIA: Primary | ICD-10-CM

## 2022-07-20 DIAGNOSIS — R93.1 ELEVATED CORONARY ARTERY CALCIUM SCORE: ICD-10-CM

## 2022-07-21 RX ORDER — ATORVASTATIN CALCIUM 10 MG/1
10 TABLET, FILM COATED ORAL NIGHTLY
Qty: 90 TABLET | Refills: 0 | Status: SHIPPED | OUTPATIENT
Start: 2022-07-21

## 2022-07-26 ENCOUNTER — OFFICE VISIT (OUTPATIENT)
Facility: LOCATION | Age: 72
End: 2022-07-26
Payer: MEDICARE

## 2022-07-26 VITALS — DIASTOLIC BLOOD PRESSURE: 86 MMHG | SYSTOLIC BLOOD PRESSURE: 141 MMHG | HEART RATE: 72 BPM

## 2022-07-26 DIAGNOSIS — Z83.71 FAMILY HISTORY OF COLONIC POLYPS: ICD-10-CM

## 2022-07-26 DIAGNOSIS — Z80.0 FAMILY HISTORY OF COLON CANCER: ICD-10-CM

## 2022-07-26 DIAGNOSIS — K90.41 ADULT FORM OF GLUTEN-SENSITIVE ENTEROPATHY: ICD-10-CM

## 2022-07-26 DIAGNOSIS — I10 ESSENTIAL HYPERTENSION, BENIGN: ICD-10-CM

## 2022-07-26 DIAGNOSIS — K63.5 POLYP OF COLON, UNSPECIFIED PART OF COLON, UNSPECIFIED TYPE: Primary | ICD-10-CM

## 2022-07-26 PROCEDURE — 99203 OFFICE O/P NEW LOW 30 MIN: CPT | Performed by: COLON & RECTAL SURGERY

## 2022-07-26 RX ORDER — POLYETHYLENE GLYCOL 3350, SODIUM CHLORIDE, SODIUM BICARBONATE, POTASSIUM CHLORIDE 420; 11.2; 5.72; 1.48 G/4L; G/4L; G/4L; G/4L
POWDER, FOR SOLUTION ORAL
Qty: 1 EACH | Refills: 0 | Status: SHIPPED | OUTPATIENT
Start: 2022-07-26

## 2022-07-27 PROBLEM — Z83.71 FAMILY HISTORY OF COLONIC POLYPS: Status: ACTIVE | Noted: 2022-07-27

## 2022-07-27 PROBLEM — Z83.719 FAMILY HISTORY OF COLONIC POLYPS: Status: ACTIVE | Noted: 2022-07-27

## 2022-07-27 PROBLEM — K90.41: Status: ACTIVE | Noted: 2022-07-27

## 2022-07-27 NOTE — PATIENT INSTRUCTIONS
I am seeing this patient in consultation regarding further care and treatment of her colon polyps, family history of colon cancer in her maternal aunt in her 62s, and a family history of her mother having had colon polyps. This patient has no bright red blood per rectum or melena. She has no narrowing of the stool or mucus in the stool. She has no history of diarrhea or constipation that is ongoing at this time. She was diagnosed with irritable bowel syndrome, however found out that she was \"gluten sensitive\". All of her irritable bowel symptoms have now resolved on a gluten-free diet. She has no abdominal pain or distention. She has not suffered weight loss as a symptom. She has no first-degree or second-degree relatives with cancer of the uterus. She has had no prior abdominal operations. She does take an aspirin 81 mg daily. We have asked her to stop that 10 days prior to the procedure. She has a mitral valve murmur for which she is asked to take prophylaxis of ampicillin and gentamicin. We will add that to her regimen. Clinical exam today reveals her abdomen to be soft, nontender, nondistended, good bowel sounds. No guarding or rebound. No masses. No ascites. The liver is normal, spleen is not palpable. Patient weighs 200 pounds. Bowel sounds have normal activity normal pitch. There are no inguinal umbilical hernias present. We are scheduling her for colonoscopy within the next few months. All risks, benefits, complications and alternatives to the proposed procedure(s) were fully discussed with the patient. All questions from the patient were answered in detail. A description of the procedure(s) and possible outcomes was fully discussed. The patient seemed to understand the conversation and its details. Consent for the procedure(s) was confirmed with the patient.

## 2022-07-29 DIAGNOSIS — Z83.71 FAMILY HISTORY OF COLONIC POLYPS: Primary | ICD-10-CM

## 2022-07-29 DIAGNOSIS — Z86.010 PERSONAL HISTORY OF COLONIC POLYPS: ICD-10-CM

## 2022-10-17 RX ORDER — ATORVASTATIN CALCIUM 10 MG/1
TABLET, FILM COATED ORAL
Qty: 90 TABLET | Refills: 0 | Status: SHIPPED | OUTPATIENT
Start: 2022-10-17

## 2022-11-28 ENCOUNTER — TELEPHONE (OUTPATIENT)
Facility: LOCATION | Age: 72
End: 2022-11-28

## 2022-11-28 NOTE — TELEPHONE ENCOUNTER
Per Medicare Guidelines -no authorization is required for Colonoscopy CPT 20287     Status: Authorization is not required however may be subject to review once claim is submitted-Covered Benefit

## 2022-12-07 ENCOUNTER — ANESTHESIA EVENT (OUTPATIENT)
Dept: ENDOSCOPY | Facility: HOSPITAL | Age: 72
End: 2022-12-07
Payer: MEDICARE

## 2022-12-08 ENCOUNTER — HOSPITAL ENCOUNTER (OUTPATIENT)
Facility: HOSPITAL | Age: 72
Setting detail: HOSPITAL OUTPATIENT SURGERY
Discharge: HOME | End: 2022-12-08
Attending: COLON & RECTAL SURGERY | Admitting: COLON & RECTAL SURGERY
Payer: MEDICARE

## 2022-12-08 ENCOUNTER — ANESTHESIA (OUTPATIENT)
Dept: ENDOSCOPY | Facility: HOSPITAL | Age: 72
End: 2022-12-08
Payer: MEDICARE

## 2022-12-08 VITALS
DIASTOLIC BLOOD PRESSURE: 88 MMHG | WEIGHT: 192 LBS | RESPIRATION RATE: 16 BRPM | SYSTOLIC BLOOD PRESSURE: 153 MMHG | TEMPERATURE: 97 F | HEART RATE: 72 BPM | HEIGHT: 67 IN | OXYGEN SATURATION: 99 % | BODY MASS INDEX: 30.13 KG/M2

## 2022-12-08 DIAGNOSIS — Z83.71 FAMILY HISTORY OF COLONIC POLYPS: ICD-10-CM

## 2022-12-08 DIAGNOSIS — Z86.010 PERSONAL HISTORY OF COLONIC POLYPS: ICD-10-CM

## 2022-12-08 PROCEDURE — 88305 TISSUE EXAM BY PATHOLOGIST: CPT | Performed by: COLON & RECTAL SURGERY

## 2022-12-08 PROCEDURE — 0DBH8ZX EXCISION OF CECUM, VIA NATURAL OR ARTIFICIAL OPENING ENDOSCOPIC, DIAGNOSTIC: ICD-10-PCS | Performed by: COLON & RECTAL SURGERY

## 2022-12-08 RX ORDER — LIDOCAINE HYDROCHLORIDE 10 MG/ML
INJECTION, SOLUTION EPIDURAL; INFILTRATION; INTRACAUDAL; PERINEURAL AS NEEDED
Status: DISCONTINUED | OUTPATIENT
Start: 2022-12-08 | End: 2022-12-08 | Stop reason: SURG

## 2022-12-08 RX ORDER — SODIUM CHLORIDE, SODIUM LACTATE, POTASSIUM CHLORIDE, CALCIUM CHLORIDE 600; 310; 30; 20 MG/100ML; MG/100ML; MG/100ML; MG/100ML
INJECTION, SOLUTION INTRAVENOUS CONTINUOUS
Status: DISCONTINUED | OUTPATIENT
Start: 2022-12-08 | End: 2022-12-08

## 2022-12-08 RX ADMIN — LIDOCAINE HYDROCHLORIDE 50 MG: 10 INJECTION, SOLUTION EPIDURAL; INFILTRATION; INTRACAUDAL; PERINEURAL at 07:40:00

## 2022-12-08 NOTE — OPERATIVE REPORT
[facility]  Op Note    Roxanna Castleman [facility] Location: OR   CSN [id number] MRN [id number]   Admission Date 12/8/2022 Operation Date 12/8/2022   Attending Physician Holly Severe, MD Operating Physician Srinivasan Goldman MD     Pre-Operative Diagnosis: Family history of colonic polyps [Z83.71]  Personal history of colonic polyps [Z86.010]    Post-Operative Diagnosis: 2 small polyps of the cecum, minimal diverticulosis    Procedure Performed: Colonoscopy    Surgeon: Srinivasan Goldman MD    Anesthesia: MAC      History of present illness: This patient has had prior polyps. She has a maternal aunt who had colon cancer. Operative findings:  The patient has 1 linear 6 mm long by 2 mm wide polyp of the cecum that was removed piecemeal.  In the same jar there was another 3 mm polyp in the base of the cecum that was fairly close to the other polyp. Both were removed with cold forceps polypectomy. The patient has a few scattered diverticuli throughout the entire exam.    Operative summary:  Following adequate IV sedation, the patient was placed in the left lateral decubitus position on the operating room table. Digital rectal examination was performed. The colonoscope was inserted, and passed to the end of the colon. Upon withdrawal of the scope, the prep was rated as follows and the [facility] bowel prep scale:  3    0. Unprepared colon segment with stool but cannot be cleared  1. Portion of mucosa in segments seen after cleaning, but other areas not seen because of retained material  2. Minor residual material after cleaning, but mucosa of segment generally well seen  3. Entire mucosa of segment well seen after cleaning        Digital rectal examination was performed, the rectal exam had the following findings:   Minimal anterior rectocele    Landmarks were identified confirming the location of the colonoscope in the cecum, including the transverse cecal fold, and the ileocecal valve.       Upon withdrawal of the scope, the patient had the following findings:  The patient does have polyps. The patient does have diverticulosis. The patient does not have inflammation. This patient has not had a previous colon resection. The scope was retroverted in the anal canal, the anal canal had the following findings:    The patient does have internal hemorrhoids. The patient does not have anal canal polyps. The patient does have hypertrophied anal papillae. The procedures performed during the procedure other than colonoscopy are listed as follows:  Cold biopsy forceps polypectomy    The scope was withdrawn, the patient was taken to the recovery room in stable postoperative condition. Pathologic specimens:  Polyps of the cecum    Complications: None      Addendum: This patient will require further colonoscopy in 3 years based on the history of prior polyps, and ongoing polyp production.        Lisa Kovacs MD  12/8/2022  8:11 AM

## 2022-12-08 NOTE — DISCHARGE INSTRUCTIONS

## 2022-12-09 ENCOUNTER — LAB ENCOUNTER (OUTPATIENT)
Dept: LAB | Age: 72
End: 2022-12-09
Attending: FAMILY MEDICINE
Payer: MEDICARE

## 2022-12-09 ENCOUNTER — OFFICE VISIT (OUTPATIENT)
Dept: FAMILY MEDICINE CLINIC | Facility: CLINIC | Age: 72
End: 2022-12-09
Payer: MEDICARE

## 2022-12-09 VITALS
RESPIRATION RATE: 16 BRPM | TEMPERATURE: 97 F | SYSTOLIC BLOOD PRESSURE: 140 MMHG | DIASTOLIC BLOOD PRESSURE: 80 MMHG | BODY MASS INDEX: 30.94 KG/M2 | HEIGHT: 66.42 IN | HEART RATE: 72 BPM | WEIGHT: 194.81 LBS

## 2022-12-09 DIAGNOSIS — E53.8 VITAMIN B 12 DEFICIENCY: ICD-10-CM

## 2022-12-09 DIAGNOSIS — I10 ESSENTIAL HYPERTENSION, BENIGN: ICD-10-CM

## 2022-12-09 DIAGNOSIS — Z11.59 NEED FOR HEPATITIS C SCREENING TEST: ICD-10-CM

## 2022-12-09 DIAGNOSIS — Z13.820 OSTEOPOROSIS SCREENING: ICD-10-CM

## 2022-12-09 DIAGNOSIS — M15.9 PRIMARY OSTEOARTHRITIS INVOLVING MULTIPLE JOINTS: ICD-10-CM

## 2022-12-09 DIAGNOSIS — Z00.00 ENCOUNTER FOR MEDICARE ANNUAL WELLNESS EXAM: Primary | ICD-10-CM

## 2022-12-09 DIAGNOSIS — R93.1 ELEVATED CORONARY ARTERY CALCIUM SCORE: ICD-10-CM

## 2022-12-09 DIAGNOSIS — F34.1 CHRONIC DEPRESSIVE PERSONALITY DISORDER: ICD-10-CM

## 2022-12-09 DIAGNOSIS — E78.2 MIXED HYPERLIPIDEMIA: ICD-10-CM

## 2022-12-09 DIAGNOSIS — K21.9 GASTROESOPHAGEAL REFLUX DISEASE, UNSPECIFIED WHETHER ESOPHAGITIS PRESENT: ICD-10-CM

## 2022-12-09 DIAGNOSIS — N18.31 CHRONIC KIDNEY DISEASE, STAGE 3A (HCC): ICD-10-CM

## 2022-12-09 DIAGNOSIS — Z78.0 POSTMENOPAUSAL: ICD-10-CM

## 2022-12-09 PROBLEM — H02.422 PTOSIS, MYOGENIC, LEFT: Status: RESOLVED | Noted: 2020-06-26 | Resolved: 2022-12-09

## 2022-12-09 PROBLEM — M23.42 CHONDRAL LOOSE BODY OF LEFT KNEE JOINT: Status: RESOLVED | Noted: 2019-04-19 | Resolved: 2022-12-09

## 2022-12-09 PROBLEM — M17.12 PRIMARY OSTEOARTHRITIS OF LEFT KNEE: Status: RESOLVED | Noted: 2019-04-19 | Resolved: 2022-12-09

## 2022-12-09 PROBLEM — Z47.89 ORTHOPEDIC AFTERCARE: Status: RESOLVED | Noted: 2019-04-22 | Resolved: 2022-12-09

## 2022-12-09 PROBLEM — M23.301 DEGENERATIVE TEAR OF LATERAL MENISCUS, LEFT: Status: RESOLVED | Noted: 2019-04-19 | Resolved: 2022-12-09

## 2022-12-09 PROBLEM — Z83.71 FAMILY HISTORY OF COLONIC POLYPS: Status: RESOLVED | Noted: 2022-07-27 | Resolved: 2022-12-09

## 2022-12-09 PROBLEM — M23.322 DEGENERATIVE TEAR OF POSTERIOR HORN OF MEDIAL MENISCUS, LEFT: Status: RESOLVED | Noted: 2019-04-19 | Resolved: 2022-12-09

## 2022-12-09 PROBLEM — Z83.719 FAMILY HISTORY OF COLONIC POLYPS: Status: RESOLVED | Noted: 2022-07-27 | Resolved: 2022-12-09

## 2022-12-09 PROBLEM — H50.22 VERTICAL STRABISMUS, LEFT EYE: Status: RESOLVED | Noted: 2020-03-12 | Resolved: 2022-12-09

## 2022-12-09 PROBLEM — H02.421 PTOSIS, MYOGENIC, RIGHT: Status: RESOLVED | Noted: 2020-06-26 | Resolved: 2022-12-09

## 2022-12-09 LAB
ALBUMIN SERPL-MCNC: 3.9 G/DL (ref 3.4–5)
ALBUMIN/GLOB SERPL: 1.3 {RATIO} (ref 1–2)
ALP LIVER SERPL-CCNC: 76 U/L
ALT SERPL-CCNC: 37 U/L
ANION GAP SERPL CALC-SCNC: 6 MMOL/L (ref 0–18)
AST SERPL-CCNC: 23 U/L (ref 15–37)
BASOPHILS # BLD AUTO: 0.04 X10(3) UL (ref 0–0.2)
BASOPHILS NFR BLD AUTO: 0.8 %
BILIRUB SERPL-MCNC: 0.7 MG/DL (ref 0.1–2)
BUN BLD-MCNC: 20 MG/DL (ref 7–18)
BUN/CREAT SERPL: 23 (ref 10–20)
CALCIUM BLD-MCNC: 9.6 MG/DL (ref 8.5–10.1)
CHLORIDE SERPL-SCNC: 108 MMOL/L (ref 98–112)
CHOLEST SERPL-MCNC: 164 MG/DL (ref ?–200)
CO2 SERPL-SCNC: 28 MMOL/L (ref 21–32)
CREAT BLD-MCNC: 0.87 MG/DL
DEPRECATED RDW RBC AUTO: 41.9 FL (ref 35.1–46.3)
EOSINOPHIL # BLD AUTO: 0.12 X10(3) UL (ref 0–0.7)
EOSINOPHIL NFR BLD AUTO: 2.3 %
ERYTHROCYTE [DISTWIDTH] IN BLOOD BY AUTOMATED COUNT: 11.5 % (ref 11–15)
FASTING PATIENT LIPID ANSWER: YES
FASTING STATUS PATIENT QL REPORTED: YES
GFR SERPLBLD BASED ON 1.73 SQ M-ARVRAT: 71 ML/MIN/1.73M2 (ref 60–?)
GLOBULIN PLAS-MCNC: 2.9 G/DL (ref 2.8–4.4)
GLUCOSE BLD-MCNC: 101 MG/DL (ref 70–99)
HCT VFR BLD AUTO: 44.8 %
HCV AB SERPL QL IA: NONREACTIVE
HDLC SERPL-MCNC: 75 MG/DL (ref 40–59)
HGB BLD-MCNC: 14.5 G/DL
IMM GRANULOCYTES # BLD AUTO: 0.01 X10(3) UL (ref 0–1)
IMM GRANULOCYTES NFR BLD: 0.2 %
LDLC SERPL CALC-MCNC: 73 MG/DL (ref ?–100)
LYMPHOCYTES # BLD AUTO: 1.13 X10(3) UL (ref 1–4)
LYMPHOCYTES NFR BLD AUTO: 22.1 %
MCH RBC QN AUTO: 32.3 PG (ref 26–34)
MCHC RBC AUTO-ENTMCNC: 32.4 G/DL (ref 31–37)
MCV RBC AUTO: 99.8 FL
MONOCYTES # BLD AUTO: 0.58 X10(3) UL (ref 0.1–1)
MONOCYTES NFR BLD AUTO: 11.3 %
NEUTROPHILS # BLD AUTO: 3.24 X10 (3) UL (ref 1.5–7.7)
NEUTROPHILS # BLD AUTO: 3.24 X10(3) UL (ref 1.5–7.7)
NEUTROPHILS NFR BLD AUTO: 63.3 %
NONHDLC SERPL-MCNC: 89 MG/DL (ref ?–130)
OSMOLALITY SERPL CALC.SUM OF ELEC: 297 MOSM/KG (ref 275–295)
PLATELET # BLD AUTO: 275 10(3)UL (ref 150–450)
POTASSIUM SERPL-SCNC: 4.5 MMOL/L (ref 3.5–5.1)
PROT SERPL-MCNC: 6.8 G/DL (ref 6.4–8.2)
RBC # BLD AUTO: 4.49 X10(6)UL
SODIUM SERPL-SCNC: 142 MMOL/L (ref 136–145)
TRIGL SERPL-MCNC: 90 MG/DL (ref 30–149)
VLDLC SERPL CALC-MCNC: 14 MG/DL (ref 0–30)
WBC # BLD AUTO: 5.1 X10(3) UL (ref 4–11)

## 2022-12-09 PROCEDURE — 80053 COMPREHEN METABOLIC PANEL: CPT

## 2022-12-09 PROCEDURE — 85025 COMPLETE CBC W/AUTO DIFF WBC: CPT

## 2022-12-09 PROCEDURE — 99213 OFFICE O/P EST LOW 20 MIN: CPT | Performed by: FAMILY MEDICINE

## 2022-12-09 PROCEDURE — 86803 HEPATITIS C AB TEST: CPT

## 2022-12-09 PROCEDURE — 80061 LIPID PANEL: CPT

## 2022-12-09 PROCEDURE — G0439 PPPS, SUBSEQ VISIT: HCPCS | Performed by: FAMILY MEDICINE

## 2022-12-09 RX ORDER — LOSARTAN POTASSIUM 50 MG/1
TABLET ORAL
Qty: 90 TABLET | Refills: 1 | Status: SHIPPED | OUTPATIENT
Start: 2022-12-09

## 2022-12-09 RX ORDER — ATORVASTATIN CALCIUM 10 MG/1
10 TABLET, FILM COATED ORAL NIGHTLY
Qty: 90 TABLET | Refills: 1 | Status: SHIPPED | OUTPATIENT
Start: 2022-12-09

## 2022-12-09 RX ORDER — NABUMETONE 500 MG/1
500 TABLET, FILM COATED ORAL DAILY
Qty: 90 TABLET | Refills: 1 | Status: SHIPPED | OUTPATIENT
Start: 2022-12-09

## 2022-12-09 RX ORDER — CITALOPRAM 20 MG/1
TABLET ORAL
Qty: 90 TABLET | Refills: 1 | Status: SHIPPED | OUTPATIENT
Start: 2022-12-09

## 2022-12-12 ENCOUNTER — HOSPITAL ENCOUNTER (OUTPATIENT)
Dept: BONE DENSITY | Age: 72
Discharge: HOME | End: 2022-12-12
Attending: FAMILY MEDICINE
Payer: MEDICARE

## 2022-12-12 DIAGNOSIS — Z78.0 POSTMENOPAUSAL: ICD-10-CM

## 2022-12-12 DIAGNOSIS — Z13.820 OSTEOPOROSIS SCREENING: ICD-10-CM

## 2022-12-12 PROCEDURE — 77080 DXA BONE DENSITY AXIAL: CPT | Performed by: FAMILY MEDICINE

## 2022-12-29 ENCOUNTER — PATIENT OUTREACH (OUTPATIENT)
Facility: LOCATION | Age: 72
End: 2022-12-29

## 2023-01-06 ENCOUNTER — MED REC SCAN ONLY (OUTPATIENT)
Dept: FAMILY MEDICINE CLINIC | Facility: CLINIC | Age: 73
End: 2023-01-06

## 2023-05-03 ENCOUNTER — PATIENT OUTREACH (OUTPATIENT)
Facility: LOCATION | Age: 73
End: 2023-05-03

## 2023-06-09 ENCOUNTER — LAB ENCOUNTER (OUTPATIENT)
Dept: LAB | Age: 73
End: 2023-06-09
Attending: FAMILY MEDICINE
Payer: MEDICARE

## 2023-06-09 ENCOUNTER — OFFICE VISIT (OUTPATIENT)
Dept: FAMILY MEDICINE CLINIC | Facility: CLINIC | Age: 73
End: 2023-06-09
Payer: MEDICARE

## 2023-06-09 VITALS
BODY MASS INDEX: 32.47 KG/M2 | WEIGHT: 202 LBS | SYSTOLIC BLOOD PRESSURE: 128 MMHG | TEMPERATURE: 97 F | RESPIRATION RATE: 14 BRPM | HEIGHT: 66 IN | HEART RATE: 78 BPM | DIASTOLIC BLOOD PRESSURE: 76 MMHG

## 2023-06-09 DIAGNOSIS — I10 ESSENTIAL HYPERTENSION, BENIGN: Primary | ICD-10-CM

## 2023-06-09 DIAGNOSIS — F34.1 CHRONIC DEPRESSIVE PERSONALITY DISORDER: ICD-10-CM

## 2023-06-09 DIAGNOSIS — I10 ESSENTIAL HYPERTENSION, BENIGN: ICD-10-CM

## 2023-06-09 DIAGNOSIS — E78.2 MIXED HYPERLIPIDEMIA: ICD-10-CM

## 2023-06-09 DIAGNOSIS — M15.9 PRIMARY OSTEOARTHRITIS INVOLVING MULTIPLE JOINTS: ICD-10-CM

## 2023-06-09 DIAGNOSIS — Z12.31 ENCOUNTER FOR SCREENING MAMMOGRAM FOR MALIGNANT NEOPLASM OF BREAST: ICD-10-CM

## 2023-06-09 LAB
ALBUMIN SERPL-MCNC: 3.9 G/DL (ref 3.4–5)
ALBUMIN/GLOB SERPL: 1.2 {RATIO} (ref 1–2)
ALP LIVER SERPL-CCNC: 81 U/L
ALT SERPL-CCNC: 42 U/L
ANION GAP SERPL CALC-SCNC: 3 MMOL/L (ref 0–18)
AST SERPL-CCNC: 30 U/L (ref 15–37)
BILIRUB SERPL-MCNC: 0.5 MG/DL (ref 0.1–2)
BUN BLD-MCNC: 22 MG/DL (ref 7–18)
CALCIUM BLD-MCNC: 9 MG/DL (ref 8.5–10.1)
CHLORIDE SERPL-SCNC: 112 MMOL/L (ref 98–112)
CHOLEST SERPL-MCNC: 169 MG/DL (ref ?–200)
CO2 SERPL-SCNC: 27 MMOL/L (ref 21–32)
CREAT BLD-MCNC: 0.96 MG/DL
FASTING PATIENT LIPID ANSWER: YES
FASTING STATUS PATIENT QL REPORTED: YES
GFR SERPLBLD BASED ON 1.73 SQ M-ARVRAT: 63 ML/MIN/1.73M2 (ref 60–?)
GLOBULIN PLAS-MCNC: 3.2 G/DL (ref 2.8–4.4)
GLUCOSE BLD-MCNC: 104 MG/DL (ref 70–99)
HDLC SERPL-MCNC: 72 MG/DL (ref 40–59)
LDLC SERPL CALC-MCNC: 80 MG/DL (ref ?–100)
NONHDLC SERPL-MCNC: 97 MG/DL (ref ?–130)
OSMOLALITY SERPL CALC.SUM OF ELEC: 298 MOSM/KG (ref 275–295)
POTASSIUM SERPL-SCNC: 4.5 MMOL/L (ref 3.5–5.1)
PROT SERPL-MCNC: 7.1 G/DL (ref 6.4–8.2)
SODIUM SERPL-SCNC: 142 MMOL/L (ref 136–145)
TRIGL SERPL-MCNC: 92 MG/DL (ref 30–149)
VLDLC SERPL CALC-MCNC: 14 MG/DL (ref 0–30)

## 2023-06-09 PROCEDURE — 80053 COMPREHEN METABOLIC PANEL: CPT

## 2023-06-09 PROCEDURE — 99214 OFFICE O/P EST MOD 30 MIN: CPT | Performed by: FAMILY MEDICINE

## 2023-06-09 PROCEDURE — 80061 LIPID PANEL: CPT

## 2023-06-09 RX ORDER — ATORVASTATIN CALCIUM 10 MG/1
10 TABLET, FILM COATED ORAL NIGHTLY
Qty: 90 TABLET | Refills: 1 | Status: SHIPPED | OUTPATIENT
Start: 2023-06-09

## 2023-06-09 RX ORDER — LOSARTAN POTASSIUM 50 MG/1
TABLET ORAL
Qty: 90 TABLET | Refills: 1 | Status: SHIPPED | OUTPATIENT
Start: 2023-06-09

## 2023-06-09 RX ORDER — CITALOPRAM 20 MG/1
TABLET ORAL
Qty: 90 TABLET | Refills: 1 | Status: SHIPPED | OUTPATIENT
Start: 2023-06-09

## 2023-06-09 RX ORDER — NABUMETONE 500 MG/1
500 TABLET, FILM COATED ORAL DAILY
Qty: 90 TABLET | Refills: 1 | Status: SHIPPED | OUTPATIENT
Start: 2023-06-09

## 2023-07-07 ENCOUNTER — HOSPITAL ENCOUNTER (OUTPATIENT)
Dept: MAMMOGRAPHY | Age: 73
Discharge: HOME | End: 2023-07-07
Attending: FAMILY MEDICINE
Payer: MEDICARE

## 2023-07-07 DIAGNOSIS — Z12.31 ENCOUNTER FOR SCREENING MAMMOGRAM FOR MALIGNANT NEOPLASM OF BREAST: ICD-10-CM

## 2023-07-07 PROCEDURE — 77067 SCR MAMMO BI INCL CAD: CPT | Performed by: FAMILY MEDICINE

## 2023-07-07 PROCEDURE — 77063 BREAST TOMOSYNTHESIS BI: CPT | Performed by: FAMILY MEDICINE

## 2023-07-21 ENCOUNTER — HOSPITAL ENCOUNTER (OUTPATIENT)
Dept: MAMMOGRAPHY | Facility: HOSPITAL | Age: 73
Discharge: HOME | End: 2023-07-21
Attending: FAMILY MEDICINE
Payer: MEDICARE

## 2023-07-21 DIAGNOSIS — R92.2 INCONCLUSIVE MAMMOGRAM: ICD-10-CM

## 2023-07-21 PROCEDURE — 76642 ULTRASOUND BREAST LIMITED: CPT | Performed by: FAMILY MEDICINE

## 2023-07-21 PROCEDURE — 77065 DX MAMMO INCL CAD UNI: CPT | Performed by: FAMILY MEDICINE

## 2023-07-21 PROCEDURE — 77061 BREAST TOMOSYNTHESIS UNI: CPT | Performed by: FAMILY MEDICINE

## 2023-11-16 ENCOUNTER — OFFICE VISIT (OUTPATIENT)
Facility: LOCATION | Age: 73
End: 2023-11-16
Payer: MEDICARE

## 2023-11-16 DIAGNOSIS — R05.9 COUGH IN ADULT PATIENT: Primary | ICD-10-CM

## 2023-11-16 PROCEDURE — 99212 OFFICE O/P EST SF 10 MIN: CPT | Performed by: OTOLARYNGOLOGY

## 2023-11-16 RX ORDER — AZITHROMYCIN 250 MG/1
TABLET, FILM COATED ORAL
Qty: 6 TABLET | Refills: 0 | Status: SHIPPED | OUTPATIENT
Start: 2023-11-16

## 2023-11-16 RX ORDER — PREDNISONE 5 MG/1
5 TABLET ORAL DAILY
Qty: 7 TABLET | Refills: 0 | Status: SHIPPED | OUTPATIENT
Start: 2023-11-16

## 2023-11-16 NOTE — PROGRESS NOTES
Vandana Arnold is a 68year old female. Chief Complaint   Patient presents with    Sinus Problem     HPI:   She has had a cough for 1 month. REVIEW OF SYSTEMS:   GENERAL HEALTH: feels well otherwise  GENERAL : denies fever, chills, sweats, weight loss, weight gain  SKIN: denies any unusual skin lesions or rashes  RESPIRATORY: denies shortness of breath with exertion  NEURO: denies headaches    EXAM:   There were no vitals taken for this visit. System Findings Details   Constitutional  Overall appearance - Normal.   Psychiatric  Orientation - Oriented to time, place, person & situation. Appropriate mood and affect. Head/Face  Facial features -- Normal. Skull - Normal.   Eyes  Pupils equal ,round ,react to light and accomidate   Ears, Nose, Throat, Neck  Ears clear nose clear pharynx clear neck no masses   Neurological  Memory - Normal. Cranial nerves - Cranial nerves II through XII grossly intact. Lymph Detail  Submental. Submandibular. Anterior cervical. Posterior cervical. Supraclavicular. ASSESSMENT AND PLAN:   1. Cough in adult patient  This sounds more like lower airway. She will try a [employer]-Royce and low-dose prednisone. If she is not better she may need a chest x-ray. The patient indicates understanding of these issues and agrees to the plan. No follow-ups on file.     Rishabh Murray MD  11/16/2023  5:48 PM

## 2023-12-02 DIAGNOSIS — I10 ESSENTIAL HYPERTENSION, BENIGN: ICD-10-CM

## 2023-12-02 DIAGNOSIS — M15.9 PRIMARY OSTEOARTHRITIS INVOLVING MULTIPLE JOINTS: ICD-10-CM

## 2023-12-02 DIAGNOSIS — F34.1 CHRONIC DEPRESSIVE PERSONALITY DISORDER: ICD-10-CM

## 2023-12-04 DIAGNOSIS — E78.2 MIXED HYPERLIPIDEMIA: ICD-10-CM

## 2023-12-04 RX ORDER — LOSARTAN POTASSIUM 50 MG/1
TABLET ORAL
Qty: 90 TABLET | Refills: 1 | Status: SHIPPED | OUTPATIENT
Start: 2023-12-04

## 2023-12-04 RX ORDER — NABUMETONE 500 MG/1
500 TABLET, FILM COATED ORAL DAILY
Qty: 90 TABLET | Refills: 0 | Status: SHIPPED | OUTPATIENT
Start: 2023-12-04

## 2023-12-04 RX ORDER — ATORVASTATIN CALCIUM 10 MG/1
10 TABLET, FILM COATED ORAL NIGHTLY
Qty: 90 TABLET | Refills: 1 | Status: SHIPPED | OUTPATIENT
Start: 2023-12-04

## 2023-12-04 RX ORDER — CITALOPRAM 20 MG/1
TABLET ORAL
Qty: 90 TABLET | Refills: 0 | Status: SHIPPED | OUTPATIENT
Start: 2023-12-04

## 2023-12-04 NOTE — TELEPHONE ENCOUNTER
LOV: 6/9/23  Next OV: 12/8/23  Last Refill:6/9/23    Medication Quantity Refills Start End   losartan 50 MG Oral Tab 90 tablet 1 6/9/2023    Sig:   TAKE 1 TABLET(50 MG) BY MOUTH DAILY         Medication Quantity Refills Start End   nabumetone 500 MG Oral Tab 90 tablet 1 6/9/2023    Sig:   Take 1 tablet (500 mg total) by mouth daily. Medication Quantity Refills Start End   citalopram 20 MG Oral Tab 90 tablet 1 6/9/2023    Sig:   TAKE 1 TABLET BY MOUTH EVERY DAY         Please authorize if acceptable. Thank you!

## 2023-12-04 NOTE — TELEPHONE ENCOUNTER
LOV: 6/9/23  Next OV: 12/8/23  Last Refill:6/9/23    Medication Quantity Refills Start End   atorvastatin 10 MG Oral Tab 90 tablet 1 6/9/2023    Sig:   Take 1 tablet (10 mg total) by mouth nightly. Route:   Oral         Please authorize if acceptable. Thank you!

## 2023-12-08 ENCOUNTER — OFFICE VISIT (OUTPATIENT)
Dept: FAMILY MEDICINE CLINIC | Facility: CLINIC | Age: 73
End: 2023-12-08
Payer: MEDICARE

## 2023-12-08 ENCOUNTER — LAB ENCOUNTER (OUTPATIENT)
Dept: LAB | Age: 73
End: 2023-12-08
Attending: FAMILY MEDICINE
Payer: MEDICARE

## 2023-12-08 VITALS
WEIGHT: 200 LBS | HEART RATE: 78 BPM | RESPIRATION RATE: 16 BRPM | TEMPERATURE: 97 F | SYSTOLIC BLOOD PRESSURE: 126 MMHG | BODY MASS INDEX: 32.14 KG/M2 | HEIGHT: 66 IN | DIASTOLIC BLOOD PRESSURE: 86 MMHG

## 2023-12-08 DIAGNOSIS — I10 ESSENTIAL HYPERTENSION, BENIGN: ICD-10-CM

## 2023-12-08 DIAGNOSIS — N18.31 CHRONIC KIDNEY DISEASE, STAGE 3A (HCC): ICD-10-CM

## 2023-12-08 DIAGNOSIS — F34.1 CHRONIC DEPRESSIVE PERSONALITY DISORDER: ICD-10-CM

## 2023-12-08 DIAGNOSIS — E53.8 VITAMIN B 12 DEFICIENCY: ICD-10-CM

## 2023-12-08 DIAGNOSIS — E78.2 MIXED HYPERLIPIDEMIA: ICD-10-CM

## 2023-12-08 DIAGNOSIS — K21.9 GASTROESOPHAGEAL REFLUX DISEASE, UNSPECIFIED WHETHER ESOPHAGITIS PRESENT: ICD-10-CM

## 2023-12-08 DIAGNOSIS — R93.1 ELEVATED CORONARY ARTERY CALCIUM SCORE: ICD-10-CM

## 2023-12-08 DIAGNOSIS — Z00.00 ENCOUNTER FOR MEDICARE ANNUAL WELLNESS EXAM: Primary | ICD-10-CM

## 2023-12-08 DIAGNOSIS — M15.9 PRIMARY OSTEOARTHRITIS INVOLVING MULTIPLE JOINTS: ICD-10-CM

## 2023-12-08 LAB
ALBUMIN SERPL-MCNC: 3.9 G/DL (ref 3.4–5)
ALBUMIN/GLOB SERPL: 1.3 {RATIO} (ref 1–2)
ALP LIVER SERPL-CCNC: 73 U/L
ALT SERPL-CCNC: 44 U/L
ANION GAP SERPL CALC-SCNC: 5 MMOL/L (ref 0–18)
AST SERPL-CCNC: 27 U/L (ref 15–37)
BASOPHILS # BLD AUTO: 0.06 X10(3) UL (ref 0–0.2)
BASOPHILS NFR BLD AUTO: 1.2 %
BILIRUB SERPL-MCNC: 0.6 MG/DL (ref 0.1–2)
BUN BLD-MCNC: 18 MG/DL (ref 9–23)
CALCIUM BLD-MCNC: 9.1 MG/DL (ref 8.5–10.1)
CHLORIDE SERPL-SCNC: 109 MMOL/L (ref 98–112)
CHOLEST SERPL-MCNC: 176 MG/DL (ref ?–200)
CO2 SERPL-SCNC: 26 MMOL/L (ref 21–32)
CREAT BLD-MCNC: 1.04 MG/DL
EGFRCR SERPLBLD CKD-EPI 2021: 57 ML/MIN/1.73M2 (ref 60–?)
EOSINOPHIL # BLD AUTO: 0.21 X10(3) UL (ref 0–0.7)
EOSINOPHIL NFR BLD AUTO: 4.2 %
ERYTHROCYTE [DISTWIDTH] IN BLOOD BY AUTOMATED COUNT: 11.5 %
FASTING PATIENT LIPID ANSWER: YES
FASTING STATUS PATIENT QL REPORTED: YES
GLOBULIN PLAS-MCNC: 3 G/DL (ref 2.8–4.4)
GLUCOSE BLD-MCNC: 94 MG/DL (ref 70–99)
HCT VFR BLD AUTO: 43.1 %
HDLC SERPL-MCNC: 79 MG/DL (ref 40–59)
HGB BLD-MCNC: 14.6 G/DL
IMM GRANULOCYTES # BLD AUTO: 0.01 X10(3) UL (ref 0–1)
IMM GRANULOCYTES NFR BLD: 0.2 %
LDLC SERPL CALC-MCNC: 83 MG/DL (ref ?–100)
LYMPHOCYTES # BLD AUTO: 1.63 X10(3) UL (ref 1–4)
LYMPHOCYTES NFR BLD AUTO: 32.4 %
MCH RBC QN AUTO: 33.3 PG (ref 26–34)
MCHC RBC AUTO-ENTMCNC: 33.9 G/DL (ref 31–37)
MCV RBC AUTO: 98.2 FL
MONOCYTES # BLD AUTO: 0.54 X10(3) UL (ref 0.1–1)
MONOCYTES NFR BLD AUTO: 10.7 %
NEUTROPHILS # BLD AUTO: 2.58 X10 (3) UL (ref 1.5–7.7)
NEUTROPHILS # BLD AUTO: 2.58 X10(3) UL (ref 1.5–7.7)
NEUTROPHILS NFR BLD AUTO: 51.3 %
NONHDLC SERPL-MCNC: 97 MG/DL (ref ?–130)
OSMOLALITY SERPL CALC.SUM OF ELEC: 292 MOSM/KG (ref 275–295)
PLATELET # BLD AUTO: 268 10(3)UL (ref 150–450)
POTASSIUM SERPL-SCNC: 4.3 MMOL/L (ref 3.5–5.1)
PROT SERPL-MCNC: 6.9 G/DL (ref 6.4–8.2)
RBC # BLD AUTO: 4.39 X10(6)UL
SODIUM SERPL-SCNC: 140 MMOL/L (ref 136–145)
TRIGL SERPL-MCNC: 77 MG/DL (ref 30–149)
VIT B12 SERPL-MCNC: 1118 PG/ML (ref 193–986)
VLDLC SERPL CALC-MCNC: 12 MG/DL (ref 0–30)
WBC # BLD AUTO: 5 X10(3) UL (ref 4–11)

## 2023-12-08 PROCEDURE — 82607 VITAMIN B-12: CPT

## 2023-12-08 PROCEDURE — 80053 COMPREHEN METABOLIC PANEL: CPT

## 2023-12-08 PROCEDURE — 85025 COMPLETE CBC W/AUTO DIFF WBC: CPT

## 2023-12-08 PROCEDURE — 80061 LIPID PANEL: CPT

## 2023-12-08 RX ORDER — CITALOPRAM 20 MG/1
TABLET ORAL
Qty: 90 TABLET | Refills: 1 | Status: SHIPPED | OUTPATIENT
Start: 2023-12-08

## 2023-12-08 RX ORDER — NABUMETONE 500 MG/1
500 TABLET, FILM COATED ORAL DAILY
Qty: 90 TABLET | Refills: 1 | Status: SHIPPED | OUTPATIENT
Start: 2023-12-08

## 2024-02-05 ENCOUNTER — HOSPITAL ENCOUNTER (OUTPATIENT)
Dept: MAMMOGRAPHY | Facility: HOSPITAL | Age: 74
Discharge: HOME | End: 2024-02-05
Attending: FAMILY MEDICINE
Payer: MEDICARE

## 2024-02-05 DIAGNOSIS — N64.89 BREAST ASYMMETRY: ICD-10-CM

## 2024-02-05 PROCEDURE — 77065 DX MAMMO INCL CAD UNI: CPT | Performed by: FAMILY MEDICINE

## 2024-02-05 PROCEDURE — 77061 BREAST TOMOSYNTHESIS UNI: CPT | Performed by: FAMILY MEDICINE

## 2024-02-06 DIAGNOSIS — Z12.31 SCREENING MAMMOGRAM FOR BREAST CANCER: Primary | ICD-10-CM

## 2024-04-08 DIAGNOSIS — F34.1 CHRONIC DEPRESSIVE PERSONALITY DISORDER: ICD-10-CM

## 2024-04-09 RX ORDER — CITALOPRAM 20 MG/1
TABLET ORAL
Qty: 90 TABLET | Refills: 0 | Status: SHIPPED | OUTPATIENT
Start: 2024-04-09

## 2024-06-03 ENCOUNTER — HOSPITAL ENCOUNTER (OUTPATIENT)
Dept: ULTRASOUND IMAGING | Facility: HOSPITAL | Age: 74
Discharge: HOME | End: 2024-06-03
Attending: FAMILY MEDICINE
Payer: MEDICARE

## 2024-06-03 ENCOUNTER — TELEPHONE (OUTPATIENT)
Dept: FAMILY MEDICINE CLINIC | Facility: CLINIC | Age: 74
End: 2024-06-03

## 2024-06-03 ENCOUNTER — OFFICE VISIT (OUTPATIENT)
Dept: FAMILY MEDICINE CLINIC | Facility: CLINIC | Age: 74
End: 2024-06-03
Payer: MEDICARE

## 2024-06-03 ENCOUNTER — LAB ENCOUNTER (OUTPATIENT)
Dept: LAB | Age: 74
End: 2024-06-03
Attending: FAMILY MEDICINE
Payer: MEDICARE

## 2024-06-03 VITALS
RESPIRATION RATE: 16 BRPM | HEART RATE: 75 BPM | DIASTOLIC BLOOD PRESSURE: 88 MMHG | SYSTOLIC BLOOD PRESSURE: 130 MMHG | TEMPERATURE: 97 F | WEIGHT: 205 LBS | HEIGHT: 66 IN | BODY MASS INDEX: 32.95 KG/M2

## 2024-06-03 DIAGNOSIS — R10.11 RUQ PAIN: ICD-10-CM

## 2024-06-03 DIAGNOSIS — R10.11 RUQ PAIN: Primary | ICD-10-CM

## 2024-06-03 LAB
ALBUMIN SERPL-MCNC: 3.8 G/DL (ref 3.4–5)
ALBUMIN/GLOB SERPL: 1.1 {RATIO} (ref 1–2)
ALP LIVER SERPL-CCNC: 80 U/L
ALT SERPL-CCNC: 47 U/L
ANION GAP SERPL CALC-SCNC: 5 MMOL/L (ref 0–18)
AST SERPL-CCNC: 33 U/L (ref 15–37)
BASOPHILS # BLD AUTO: 0.06 X10(3) UL (ref 0–0.2)
BASOPHILS NFR BLD AUTO: 1 %
BILIRUB SERPL-MCNC: 0.5 MG/DL (ref 0.1–2)
BUN BLD-MCNC: 19 MG/DL (ref 9–23)
CALCIUM BLD-MCNC: 8.9 MG/DL (ref 8.5–10.1)
CHLORIDE SERPL-SCNC: 108 MMOL/L (ref 98–112)
CO2 SERPL-SCNC: 26 MMOL/L (ref 21–32)
CREAT BLD-MCNC: 1.01 MG/DL
EGFRCR SERPLBLD CKD-EPI 2021: 59 ML/MIN/1.73M2 (ref 60–?)
EOSINOPHIL # BLD AUTO: 0.2 X10(3) UL (ref 0–0.7)
EOSINOPHIL NFR BLD AUTO: 3.2 %
ERYTHROCYTE [DISTWIDTH] IN BLOOD BY AUTOMATED COUNT: 11.8 %
FASTING STATUS PATIENT QL REPORTED: YES
GLOBULIN PLAS-MCNC: 3.5 G/DL (ref 2.8–4.4)
GLUCOSE BLD-MCNC: 100 MG/DL (ref 70–99)
HCT VFR BLD AUTO: 42 %
HGB BLD-MCNC: 14.5 G/DL
IMM GRANULOCYTES # BLD AUTO: 0.01 X10(3) UL (ref 0–1)
IMM GRANULOCYTES NFR BLD: 0.2 %
LIPASE SERPL-CCNC: 53 U/L (ref ?–300)
LYMPHOCYTES # BLD AUTO: 1.68 X10(3) UL (ref 1–4)
LYMPHOCYTES NFR BLD AUTO: 27.1 %
MCH RBC QN AUTO: 33.4 PG (ref 26–34)
MCHC RBC AUTO-ENTMCNC: 34.5 G/DL (ref 31–37)
MCV RBC AUTO: 96.8 FL
MONOCYTES # BLD AUTO: 0.53 X10(3) UL (ref 0.1–1)
MONOCYTES NFR BLD AUTO: 8.6 %
NEUTROPHILS # BLD AUTO: 3.71 X10 (3) UL (ref 1.5–7.7)
NEUTROPHILS # BLD AUTO: 3.71 X10(3) UL (ref 1.5–7.7)
NEUTROPHILS NFR BLD AUTO: 59.9 %
OSMOLALITY SERPL CALC.SUM OF ELEC: 290 MOSM/KG (ref 275–295)
PLATELET # BLD AUTO: 257 10(3)UL (ref 150–450)
POTASSIUM SERPL-SCNC: 4.3 MMOL/L (ref 3.5–5.1)
PROT SERPL-MCNC: 7.3 G/DL (ref 6.4–8.2)
RBC # BLD AUTO: 4.34 X10(6)UL
SODIUM SERPL-SCNC: 139 MMOL/L (ref 136–145)
WBC # BLD AUTO: 6.2 X10(3) UL (ref 4–11)

## 2024-06-03 PROCEDURE — 83690 ASSAY OF LIPASE: CPT

## 2024-06-03 PROCEDURE — 76700 US EXAM ABDOM COMPLETE: CPT | Performed by: FAMILY MEDICINE

## 2024-06-03 PROCEDURE — 99214 OFFICE O/P EST MOD 30 MIN: CPT | Performed by: FAMILY MEDICINE

## 2024-06-03 PROCEDURE — G2211 COMPLEX E/M VISIT ADD ON: HCPCS | Performed by: FAMILY MEDICINE

## 2024-06-03 PROCEDURE — 80053 COMPREHEN METABOLIC PANEL: CPT

## 2024-06-03 PROCEDURE — 85025 COMPLETE CBC W/AUTO DIFF WBC: CPT

## 2024-06-03 NOTE — TELEPHONE ENCOUNTER
Right side abdominal pain x 4 days. No vomiting. Slight  nausea. Appt made for the patient to see Dr. Luz today at 10:45. Pt. Agreed to plan and verbalized understanding

## 2024-06-03 NOTE — TELEPHONE ENCOUNTER
1. What are your symptoms?    Right side waist area, back and front radiating, \"excruciating pain\" started Thurs but is worsening    Moving / sitting / standing / causes excruciating pain.    No known injury    2. How long have you been having these symptoms?    Thursday 5/30    3. Have you done anything already to treat your symptoms?     N/a    ADDITIONAL INFO:     Live transfer

## 2024-06-03 NOTE — TELEPHONE ENCOUNTER
Gissell from radiology calling to report STAT ultrasound results for pt.  In epic.  Please review/advise thx

## 2024-06-03 NOTE — PROGRESS NOTES
[facility] Family Medicine Office Note  Chief Complaint:   Chief Complaint   Patient presents with    Hip Pain     Hip pain that radiates on right side on abdomen        HPI:   This is a 74 year old female coming in for abdominal pain.  Pain is located at RUQ. Pain is described as sharp. Severity is moderate. Associated symptoms: + nausea without vomiting, denies diarrhea, denies blood in stool and urine. The pain radiates to right lower scapular tip.  Has had for 1 week. Pain is worsened by movement and occasionally food. Gets relief of pain with nothing. Prior abdomial pain hx: none.        Past Medical History:    Abnormal screening CT of heart    calcium score 28    Acquired ptosis of both eyelids    Allergic rhinitis due to pollen (aka 4770)    Anxiety state, unspecified    Bilateral senile cataracts    Cancer (HCC)    basal cell    Chondral loose body of left knee joint    Degenerative tear of lateral meniscus, left    Degenerative tear of posterior horn of medial meniscus, left    Dysthymic disorder    Esophageal reflux    Essential hypertension, benign    Family history of colonic polyps, mother    High blood pressure    High cholesterol    History of basal cell cancer    skin, neck    IBS (irritable bowel syndrome)    Mitral valve prolapse    Osteoarthrosis, unspecified whether generalized or localized, unspecified site    hands knees    Personal history of colonic polyps    Plantar fasciitis of right foot    Primary osteoarthritis of left knee    Ptosis, myogenic, left    Added automatically from request for surgery [id number]    Ptosis, myogenic, right    Added automatically from request for surgery [id number]    Pure hypercholesterolemia    Sleep apnea    Tear of medial meniscus of left knee    surgery scheduled 04/19/19    Tear of medial meniscus of left knee  Global 07/18/2019    Unspecified internal derangement of knee    right knee    Vertical strabismus, left eye    Added automatically from  request for surgery [id number]     Past Surgical History:   Procedure Laterality Date    Anesth,shoulder replacement Right 2/6/2015    right reverse shoulder replacement    Cataract extraction w/  intraocular lens implant Right 01/2020    Cataract extraction w/  intraocular lens implant Left 02/2020    Colonoscopy  3/8/2005    normal    Colonoscopy  2/5/2009    normal    Colonoscopy  08/19/2014    Dr. Vazquez- Repeat in 3 years    Colonoscopy N/A 09/11/2018    diverticulosis     Colonoscopy N/A 12/8/2022    Procedure: COLONOSCOPY with FORCEP POLYPECTOMY;  Surgeon: Bora Vazquez MD;  Location:  ENDOSCOPY    Ct heart w/ calcium scoring  2/2/2005    calcium score of 0    D & c  1988    Exc skin malig 0.6-1cm trunk,arm,leg  2009    skin BCC neck    Exc skin malig 2.1-3cm trunk,arm,leg Left 7/2010    left neck basal cell cancer    Excis urethral caruncle  1982    Eye muscle surg proc unlisted Left 05/29/2020    Left inferior oblique myectomy    Knee scope,med+lat menis repair Right 1995    right knee arthrscopy    Knee scope,med+lat menis repair Left 04/19/2019    medial and lateral meniscal repairs, loose body    Peripheral vascular screening historical conv Bilateral 5/9/2016    mild left carotid narrowing, PAD screen     Social History:  Social History     Socioeconomic History    Marital status:      Spouse name: Aldo    Number of children: 3    Years of education: 18   Occupational History    Occupation: Office Work/Adminstrative     Comment: works for    Tobacco Use    Smoking status: Never    Smokeless tobacco: Never   Vaping Use    Vaping status: Never Used   Substance and Sexual Activity    Alcohol use: Yes     Alcohol/week: 5.0 standard drinks of alcohol     Types: 5 Standard drinks or equivalent per week    Drug use: No    Sexual activity: Yes     Partners: Male   Other Topics Concern     Service No    Blood Transfusions No    Caffeine Concern Yes     Comment: 2 cups coffee/day     Occupational Exposure No    Hobby Hazards No    Sleep Concern No    Stress Concern No    Weight Concern No    Special Diet No    Back Care No    Exercise Yes     Comment: 5x/week water aerobics, , barre class, walking    Bike Helmet Yes    Seat Belt Yes    Self-Exams Yes   Social History Narrative    Lives with     Enjoys hiking, bicycling    Volunteer at [employer] (2018)     Family History:  Family History   Problem Relation Age of Onset    Heart Surgery Father         valve replacement    Infectious Disease Father         endocarditis    Heart Attack Father     Kidney Disease Father     Arthritis Mother     Gastro-Intestinal Disorder Mother         diverticulosis, bleeding    Infectious Disease Mother         pneumonia    Stroke Mother         hemiparesis, aphasia    Clotting Disorder Mother         Pe    Colon Cancer Mother     Infectious Disease Daughter         viral meningitis    Obesity Sister     Hypertension Sister     Infectious Disease Sister         MRSA    Pulmonary Disease Sister         on O2    Heart Attack Sister     Stroke Sister         nursing home    Substance Abuse Brother     Breast Cancer Sister 53        53    Obesity Sister     Neurological Disorder Sister         MS    BRCA gene + Sister     Diabetes Other     Thyroid Disorder Daughter     Neurological Disorder Daughter         headaches    Diabetes Other     Colon Cancer Other     Obesity Sister      Allergies:  Allergies   Allergen Reactions    Hydrocodone DIZZINESS    Grass OTHER (SEE COMMENTS)     Sinus symptoms    Codeine NAUSEA ONLY     Nausea       Current Meds:  Current Outpatient Medications   Medication Sig Dispense Refill    citalopram 20 MG Oral Tab TAKE 1 TABLET BY MOUTH EVERY DAY 90 tablet 0    nabumetone 500 MG Oral Tab Take 1 tablet (500 mg total) by mouth daily. 90 tablet 1    LOSARTAN 50 MG Oral Tab TAKE 1 TABLET(50 MG) BY MOUTH DAILY 90 tablet 1    atorvastatin 10 MG Oral Tab Take 1 tablet  (10 mg total) by mouth nightly. 90 tablet 1    mupirocin 2 % External Ointment Apply 1 Application topically 2 (two) times daily. Apply to nose twice daily 22 g 0    hydrocortisone 2.5 % External Cream Apply 1 Application. topically 2 (two) times daily. Apply to eyebrows and cheeks 30 g 5    betamethasone dipropionate 0.05 % External Lotion Apply 1 mL topically 2 (two) times daily. Apply to scalp 60 mL 5    fexofenadine 180 MG Oral Tab Take 1 tablet (180 mg total) by mouth daily.      Nitrofurantoin Macrocrystal 50 MG Oral Cap 1 CAPSULE before intercourse 50 capsule 1    LUTEIN OR Take by mouth daily.      Multiple Vitamins-Minerals (MACUVITE/LUTEIN) Oral Tab Take 1 tablet by mouth daily.      Naproxen Sodium 220 MG Oral Cap Take 220 mg by mouth daily.      Vitamin B-12 (VITAMIN B12) 500 MCG Oral Tab Take 1 tablet (500 mcg total) by mouth twice a week. 30 tablet 11    Calcium Polycarbophil (FIBERCON OR) Take  by mouth.      Omeprazole Magnesium 20 MG Oral Tab EC Take 1 tablet (20 mg total) by mouth 2 (two) times daily. 60 tablet 11    CALCIUM 500 OR qd      mupirocin 2 % External Ointment Apply 1 Application topically 2 (two) times daily. 22 g 3    azithromycin (ZITHROMAX Z-KYMBERLY) 250 MG Oral Tab Take 1 by oral route every day for 5 days. 2 tablets today. (Patient not taking: Reported on 6/3/2024) 6 tablet 0    predniSONE 5 MG Oral Tab Take 1 tablet (5 mg total) by mouth daily. (Patient not taking: Reported on 6/3/2024) 7 tablet 0      Counseling given: Not Answered       REVIEW OF SYSTEMS:   ROS:  CONSTITUTIONAL:  Denies any unusual weight gain/loss, fever, chills, weakness or fatigue.  CARDIOVASCULAR:  Denies chest pain, chest pressure or chest discomfort. No palpitations or edema.  Denies any dyspnea on exertion or at rest  RESPIRATORY:  Denies shortness of breath, cough  GASTROINTESTINAL:  + RUQ abdominal pain, nausea, denies vomiting, constipation, diarrhea, or blood in stool.  NEUROLOGICAL:  Denies headache,  dizziness, syncope, numbness or tingling in the extremities.  MUSCULOSKELETAL:  Denies muscle, back pain, joint pain or stiffness.    EXAM:   /88   Pulse 75   Temp 97 °F (36.1 °C) (Temporal)   Resp 16   Ht 5' 6\" (1.676 m)   Wt 205 lb (93 kg)   BMI 33.09 kg/m²  Estimated body mass index is 33.09 kg/m² as calculated from the following:    Height as of this encounter: 5' 6\" (1.676 m).    Weight as of this encounter: 205 lb (93 kg).   Vital signs reviewed.Appears stated age, well groomed.  Physical Exam:  GEN:  Patient is alert and oriented x3, no apparent distress  HEAD:  Normocephalic, atraumatic  LUNGS: clear to auscultation bilaterally, no rales/rhonchi/wheezing  HEART:  Regular rate and rhythm, no murmurs, rubs or gallops  ABDOMEN:  Soft, nondistended, + RUQ tenderness without rebounding/guarding, bowel sounds normal in all 4 quadrants, no hepatosplenomegaly  EXTREMITIES:  Strength intact with 5/5 bilaterally upper and lower extremities, no edema noted  NEURO:  CN 2 - 12 grossly intact     ASSESSMENT AND PLAN:   1. RUQ pain  -  r/o gallbladder disease  -  check RUQ US  -  check labs below  -  further recs once labs and imaging are finalized  - CBC W Differential W Platelet [E]; Future  - Lipase [E]; Future  - Comp Metabolic Panel (14) [E]; Future  - US ABDOMEN COMPLETE (CPT=76700); Future      Meds & Refills for this Visit:  Requested Prescriptions      No prescriptions requested or ordered in this encounter       Health Maintenance:  Health Maintenance Due   Topic Date Due    Annual Depression Screening  01/01/2024    Fall Risk Screening (Annual)  01/01/2024    COVID-19 Vaccine (7 - 2023-24 season) 02/09/2024       Patient/Caregiver Education: Patient/Caregiver Education: There are no barriers to learning. Medical education done.   Outcome: Patient verbalizes understanding. Patient is notified to call with any questions, complications, allergies, or worsening or changing symptoms.  Patient is to call  with any side effects or complications from the treatments as a result of today.     Problem List:  Patient Active Problem List   Diagnosis    Essential hypertension, benign    Chronic depressive personality disorder    GERD (gastroesophageal reflux disease)    Vitamin B 12 deficiency    Colon polyps    Family history of colon cancer, maternal aunt    Chronic kidney disease, stage 3a (HCC)    Primary osteoarthritis involving multiple joints    Mixed hyperlipidemia    Adult form of gluten-sensitive enteropathy    Elevated coronary artery calcium score       NATONIO MOSES, DO    Please note that portions of this note may have been completed with a voice recognition program. Efforts were made to edit the dictations but occasionally words are mis-transcribed.

## 2024-06-05 ENCOUNTER — HOSPITAL ENCOUNTER (OUTPATIENT)
Dept: CT IMAGING | Facility: HOSPITAL | Age: 74
Discharge: HOME | End: 2024-06-05
Attending: FAMILY MEDICINE
Payer: MEDICARE

## 2024-06-05 DIAGNOSIS — R10.11 RUQ PAIN: ICD-10-CM

## 2024-06-05 PROCEDURE — 74177 CT ABD & PELVIS W/CONTRAST: CPT | Performed by: FAMILY MEDICINE

## 2024-06-09 ENCOUNTER — PATIENT MESSAGE (OUTPATIENT)
Dept: FAMILY MEDICINE CLINIC | Facility: CLINIC | Age: 74
End: 2024-06-09

## 2024-06-10 ENCOUNTER — TELEPHONE (OUTPATIENT)
Dept: FAMILY MEDICINE CLINIC | Facility: CLINIC | Age: 74
End: 2024-06-10

## 2024-06-10 DIAGNOSIS — I10 ESSENTIAL HYPERTENSION, BENIGN: ICD-10-CM

## 2024-06-10 DIAGNOSIS — E78.2 MIXED HYPERLIPIDEMIA: ICD-10-CM

## 2024-06-10 RX ORDER — ATORVASTATIN CALCIUM 10 MG/1
10 TABLET, FILM COATED ORAL NIGHTLY
Qty: 90 TABLET | Refills: 0 | Status: SHIPPED | OUTPATIENT
Start: 2024-06-10

## 2024-06-10 RX ORDER — LOSARTAN POTASSIUM 50 MG/1
TABLET ORAL
Qty: 90 TABLET | Refills: 0 | Status: SHIPPED | OUTPATIENT
Start: 2024-06-10

## 2024-06-10 RX ORDER — ATORVASTATIN CALCIUM 10 MG/1
10 TABLET, FILM COATED ORAL NIGHTLY
Qty: 90 TABLET | Refills: 1 | OUTPATIENT
Start: 2024-06-10

## 2024-06-10 NOTE — TELEPHONE ENCOUNTER
Upcoming appointment with OB/Gyn Dr. John 8/22//2024.     Patient is asking for alternative recommendation for OB/Gyn. Informed her she can try [facility]. Patient is scheduled with Sonya PIZANO 6/24/2024 at 12PM.     Pt has follow up with Dr. Luz 6/21/2024.  Advised patient to let us know if she has worsening abdominal pain RUQ and if pain is intolerable, advised to go to ER.   Patient voiced understanding and agreed with plan of care.

## 2024-06-10 NOTE — TELEPHONE ENCOUNTER
Patient called in regards to Gynecologist referral.   Patient stated she is unable to get in until August. She stated she is still in pain and cannot wait until then.    Please advise.  Thank you.

## 2024-06-10 NOTE — TELEPHONE ENCOUNTER
Last office visit: 12/8/2023  Last Refill:   Return To Clinic: 2/2024  Protocol:     Medication Quantity Refills Start End   atorvastatin 10 MG Oral Tab 90 tablet 1 12/4/2023 --   Sig:   Take 1 tablet (10 mg total) by mouth nightly.     Route:   Oral       Medication Quantity Refills Start End   LOSARTAN 50 MG Oral Tab 90 tablet 1 12/4/2023 --   Sig:   TAKE 1 TABLET(50 MG) BY MOUTH DAILY     Route:   (none)

## 2024-06-21 ENCOUNTER — TELEPHONE (OUTPATIENT)
Facility: CLINIC | Age: 74
End: 2024-06-21

## 2024-06-21 ENCOUNTER — OFFICE VISIT (OUTPATIENT)
Dept: FAMILY MEDICINE CLINIC | Facility: CLINIC | Age: 74
End: 2024-06-21

## 2024-06-21 VITALS
HEIGHT: 65.98 IN | BODY MASS INDEX: 32.62 KG/M2 | RESPIRATION RATE: 14 BRPM | SYSTOLIC BLOOD PRESSURE: 132 MMHG | HEART RATE: 70 BPM | TEMPERATURE: 97 F | WEIGHT: 203 LBS | DIASTOLIC BLOOD PRESSURE: 80 MMHG

## 2024-06-21 DIAGNOSIS — N95.8 SIMPLE ADNEXAL CYST GREATER THAN 1 CM IN DIAMETER IN POSTMENOPAUSAL PATIENT: ICD-10-CM

## 2024-06-21 DIAGNOSIS — F34.1 CHRONIC DEPRESSIVE PERSONALITY DISORDER: ICD-10-CM

## 2024-06-21 DIAGNOSIS — E78.2 MIXED HYPERLIPIDEMIA: ICD-10-CM

## 2024-06-21 DIAGNOSIS — M15.9 PRIMARY OSTEOARTHRITIS INVOLVING MULTIPLE JOINTS: ICD-10-CM

## 2024-06-21 DIAGNOSIS — I10 ESSENTIAL HYPERTENSION, BENIGN: Primary | ICD-10-CM

## 2024-06-21 DIAGNOSIS — N94.89 SIMPLE ADNEXAL CYST GREATER THAN 1 CM IN DIAMETER IN POSTMENOPAUSAL PATIENT: ICD-10-CM

## 2024-06-21 DIAGNOSIS — R10.31 ABDOMINAL PAIN, RLQ: ICD-10-CM

## 2024-06-21 PROCEDURE — 99214 OFFICE O/P EST MOD 30 MIN: CPT | Performed by: FAMILY MEDICINE

## 2024-06-21 PROCEDURE — G2211 COMPLEX E/M VISIT ADD ON: HCPCS | Performed by: FAMILY MEDICINE

## 2024-06-21 RX ORDER — ATORVASTATIN CALCIUM 10 MG/1
10 TABLET, FILM COATED ORAL NIGHTLY
Qty: 90 TABLET | Refills: 1 | Status: SHIPPED | OUTPATIENT
Start: 2024-06-21

## 2024-06-21 RX ORDER — CITALOPRAM 20 MG/1
TABLET ORAL
Qty: 90 TABLET | Refills: 1 | Status: SHIPPED | OUTPATIENT
Start: 2024-06-21

## 2024-06-21 RX ORDER — NABUMETONE 500 MG/1
500 TABLET, FILM COATED ORAL DAILY
Qty: 90 TABLET | Refills: 1 | Status: SHIPPED | OUTPATIENT
Start: 2024-06-21

## 2024-06-21 RX ORDER — LOSARTAN POTASSIUM 50 MG/1
TABLET ORAL
Qty: 90 TABLET | Refills: 1 | Status: SHIPPED | OUTPATIENT
Start: 2024-06-21

## 2024-06-21 NOTE — TELEPHONE ENCOUNTER
Patient was rescheduled with Dr. Dr. Gonzalez for 7/11.   Patient states she wants [facility], advised patient we have opening in [place] with Dr. Gonzalez. Patient verbalized understanding.

## 2024-06-24 NOTE — PROGRESS NOTES
[facility] Family Medicine Office Note  Chief Complaint:   Chief Complaint   Patient presents with    Medication Follow-Up       HPI:   This is a 74 year old female coming in for follow up of:    1.  HTN - Patient presents for recheck of her hypertension. Pt has been taking medications as instructed, no medication side effects, home BP monitoring in the range of 120-130's systolic and 80's diastolic.    2.  Hyperlipidemia -patient is due for repeat lipid panel.  She is currently not taking medications to reduce her cholesterol.  She had a heart scan done in 2022 with a calcium score approximately 149.  She does have a family history of coronary disease.  She also has a personal history of a mitral valve prolapse.  She is not having any chest pain or shortness of breath.  She takes ibuprofen most days for arthritic pain.  She is not taking ASA 81mg daily.    3.  Depression - stable.  Taking citalopram.  Denies any side effects from medication.    4.  OA - stable.  Taking advil in the am and nabumetone in the pm.    5.  RLQ pain - patient continues to have pain in RLQ that is intermittent.  Had imaging done showing adnexal cyst.  Hasn't seen OBGYN yet to discuss treatment options nor had follow up US done.      Past Medical History:    Abnormal screening CT of heart    calcium score 28    Acquired ptosis of both eyelids    Allergic rhinitis due to pollen (aka 4770)    Anxiety state, unspecified    Bilateral senile cataracts    Cancer (HCC)    basal cell    Chondral loose body of left knee joint    Degenerative tear of lateral meniscus, left    Degenerative tear of posterior horn of medial meniscus, left    Dysthymic disorder    Esophageal reflux    Essential hypertension, benign    Family history of colonic polyps, mother    High blood pressure    High cholesterol    History of basal cell cancer    skin, neck    IBS (irritable bowel syndrome)    Mitral valve prolapse    Osteoarthrosis, unspecified whether  generalized or localized, unspecified site    hands knees    Personal history of colonic polyps    Plantar fasciitis of right foot    Primary osteoarthritis of left knee    Ptosis, myogenic, left    Added automatically from request for surgery [id number]    Ptosis, myogenic, right    Added automatically from request for surgery [id number]    Pure hypercholesterolemia    Sleep apnea    Tear of medial meniscus of left knee    surgery scheduled 04/19/19    Tear of medial meniscus of left knee  Global 07/18/2019    Unspecified internal derangement of knee    right knee    Vertical strabismus, left eye    Added automatically from request for surgery [id number]     Past Surgical History:   Procedure Laterality Date    Anesth,shoulder replacement Right 2/6/2015    right reverse shoulder replacement    Cataract extraction w/  intraocular lens implant Right 01/2020    Cataract extraction w/  intraocular lens implant Left 02/2020    Colonoscopy  3/8/2005    normal    Colonoscopy  2/5/2009    normal    Colonoscopy  08/19/2014    Dr. Vazquez- Repeat in 3 years    Colonoscopy N/A 09/11/2018    diverticulosis     Colonoscopy N/A 12/8/2022    Procedure: COLONOSCOPY with FORCEP POLYPECTOMY;  Surgeon: Bora Vazquez MD;  Location:  ENDOSCOPY    Ct heart w/ calcium scoring  2/2/2005    calcium score of 0    D & c  1988    Exc skin malig 0.6-1cm trunk,arm,leg  2009    skin BCC neck    Exc skin malig 2.1-3cm trunk,arm,leg Left 7/2010    left neck basal cell cancer    Excis urethral caruncle  1982    Eye muscle surg proc unlisted Left 05/29/2020    Left inferior oblique myectomy    Knee scope,med+lat menis repair Right 1995    right knee arthrscopy    Knee scope,med+lat menis repair Left 04/19/2019    medial and lateral meniscal repairs, loose body    Peripheral vascular screening historical conv Bilateral 5/9/2016    mild left carotid narrowing, PAD screen     Social History:  Social History     Socioeconomic History    Marital status:       Spouse name: Aldo    Number of children: 3    Years of education: 18   Occupational History    Occupation: Office Work/Adminstrative     Comment: works for    Tobacco Use    Smoking status: Never    Smokeless tobacco: Never   Vaping Use    Vaping status: Never Used   Substance and Sexual Activity    Alcohol use: Yes     Alcohol/week: 5.0 standard drinks of alcohol     Types: 5 Standard drinks or equivalent per week    Drug use: No    Sexual activity: Yes     Partners: Male   Other Topics Concern     Service No    Blood Transfusions No    Caffeine Concern Yes     Comment: 2 cups coffee/day    Occupational Exposure No    Hobby Hazards No    Sleep Concern No    Stress Concern No    Weight Concern No    Special Diet No    Back Care No    Exercise Yes     Comment: 5x/week water aerobics, , barre class, walking    Bike Helmet Yes    Seat Belt Yes    Self-Exams Yes   Social History Narrative    Lives with     Enjoys hiking, bicycling    Volunteer at [facility] (2018)     Family History:  Family History   Problem Relation Age of Onset    Heart Surgery Father         valve replacement    Infectious Disease Father         endocarditis    Heart Attack Father     Kidney Disease Father     Arthritis Mother     Gastro-Intestinal Disorder Mother         diverticulosis, bleeding    Infectious Disease Mother         pneumonia    Stroke Mother         hemiparesis, aphasia    Clotting Disorder Mother         Pe    Colon Cancer Mother     Infectious Disease Daughter         viral meningitis    Obesity Sister     Hypertension Sister     Infectious Disease Sister         MRSA    Pulmonary Disease Sister         on O2    Heart Attack Sister     Stroke Sister         nursing home    Substance Abuse Brother     Breast Cancer Sister 53        53    Obesity Sister     Neurological Disorder Sister         MS    BRCA gene + Sister     Diabetes Other     Thyroid Disorder Daughter     Neurological  Disorder Daughter         headaches    Diabetes Other     Colon Cancer Other     Obesity Sister      Allergies:  Allergies   Allergen Reactions    Hydrocodone DIZZINESS    Grass OTHER (SEE COMMENTS)     Sinus symptoms    Codeine NAUSEA ONLY     Nausea       Current Meds:  Current Outpatient Medications   Medication Sig Dispense Refill    atorvastatin 10 MG Oral Tab Take 1 tablet (10 mg total) by mouth nightly. 90 tablet 1    nabumetone 500 MG Oral Tab Take 1 tablet (500 mg total) by mouth daily. 90 tablet 1    losartan 50 MG Oral Tab TAKE 1 TABLET(50 MG) BY MOUTH DAILY 90 tablet 1    citalopram 20 MG Oral Tab TAKE 1 TABLET BY MOUTH EVERY DAY 90 tablet 1    hydrocortisone 2.5 % External Cream Apply 1 Application. topically 2 (two) times daily. Apply to eyebrows and cheeks 30 g 5    fexofenadine 180 MG Oral Tab Take 1 tablet (180 mg total) by mouth daily.      Nitrofurantoin Macrocrystal 50 MG Oral Cap 1 CAPSULE before intercourse 50 capsule 1    LUTEIN OR Take by mouth daily.      Multiple Vitamins-Minerals (MACUVITE/LUTEIN) Oral Tab Take 1 tablet by mouth daily.      Naproxen Sodium 220 MG Oral Cap Take 220 mg by mouth daily.      Vitamin B-12 (VITAMIN B12) 500 MCG Oral Tab Take 1 tablet (500 mcg total) by mouth twice a week. 30 tablet 11    Calcium Polycarbophil (FIBERCON OR) Take  by mouth.      Omeprazole Magnesium 20 MG Oral Tab EC Take 1 tablet (20 mg total) by mouth 2 (two) times daily. 60 tablet 11    CALCIUM 500 OR qd      mupirocin 2 % External Ointment Apply 1 Application topically 2 (two) times daily. 22 g 3    azithromycin (ZITHROMAX Z-KYMBERLY) 250 MG Oral Tab Take 1 by oral route every day for 5 days. 2 tablets today. (Patient not taking: Reported on 6/3/2024) 6 tablet 0    predniSONE 5 MG Oral Tab Take 1 tablet (5 mg total) by mouth daily. (Patient not taking: Reported on 6/3/2024) 7 tablet 0    mupirocin 2 % External Ointment Apply 1 Application topically 2 (two) times daily. Apply to nose twice daily  (Patient not taking: Reported on 6/21/2024) 22 g 0    betamethasone dipropionate 0.05 % External Lotion Apply 1 mL topically 2 (two) times daily. Apply to scalp (Patient not taking: Reported on 6/21/2024) 60 mL 5      Counseling given: Not Answered       REVIEW OF SYSTEMS:   ROS:  CONSTITUTIONAL:  Denies any unusual weight gain/loss, fever, chills, weakness or fatigue.  CARDIOVASCULAR:  Denies chest pain, chest pressure or chest discomfort. No palpitations or edema.  Denies any dyspnea on exertion or at rest  RESPIRATORY:  Denies shortness of breath, cough  GASTROINTESTINAL:  Denies any abdominal pain, nausea, vomiting  NEUROLOGICAL:  Denies headache, dizziness, syncope, numbness or tingling in the extremities.  MUSCULOSKELETAL:  + multiple joint pains    EXAM:   /80   Pulse 70   Temp 97.3 °F (36.3 °C)   Resp 14   Ht 5' 5.98\" (1.676 m)   Wt 203 lb (92.1 kg)   BMI 32.78 kg/m²  Estimated body mass index is 32.78 kg/m² as calculated from the following:    Height as of this encounter: 5' 5.98\" (1.676 m).    Weight as of this encounter: 203 lb (92.1 kg).   Vital signs reviewed.Appears stated age, well groomed.  Physical Exam:  GEN:  Patient is alert and oriented x3, no apparent distress  HEAD:  Normocephalic, atraumatic  LUNGS: clear to auscultation bilaterally, no rales/rhonchi/wheezing  HEART:  Regular rate and rhythm, no murmurs, rubs or gallops  ABDOMEN:  Soft, nondistended, nontender, bowel sounds normal in all 4 quadrants, no hepatosplenomegaly  EXTREMITIES:  Strength intact with 5/5 bilaterally upper and lower extremities, no edema noted  NEURO:  CN 2 - 12 grossly intact     ASSESSMENT AND PLAN:   1. Essential hypertension, benign  -  Controlled  -  Check renal function  -  Monitor BP at home  -  Continue losartan 50mg daily    2. Mixed hyperlipidemia  -  Recheck lipid panel  -  Heart scan in 2022 with calcium score of approx 149.97  -  Continue low fat intake and exercise    3. Primary osteoarthritis  involving multiple joints  -  Stable  -  Continue nabumetone    4. Chronic depressive personality disorder  -  Stable  -  Continue citalopram    5. Simple adnexal cyst greater than 1 cm in diameter in postmenopausal patient  -  seen on recent US  -  refer to OBGYN  -  US pelvis ordered in the meantime    6. Abdominal pain, RLQ  -  likely 2/2 to above  -  further recs per OBGYN      Meds & Refills for this Visit:  Requested Prescriptions     Signed Prescriptions Disp Refills    atorvastatin 10 MG Oral Tab 90 tablet 1     Sig: Take 1 tablet (10 mg total) by mouth nightly.    nabumetone 500 MG Oral Tab 90 tablet 1     Sig: Take 1 tablet (500 mg total) by mouth daily.    losartan 50 MG Oral Tab 90 tablet 1     Sig: TAKE 1 TABLET(50 MG) BY MOUTH DAILY    citalopram 20 MG Oral Tab 90 tablet 1     Sig: TAKE 1 TABLET BY MOUTH EVERY DAY       Health Maintenance:  FIT/FOBT Colorectal Screening Never done  COVID-19 Vaccine(3 - Booster for Moderna series) due on 09/02/2021  Colonoscopy due on 09/11/2021  Influenza Vaccine(1) due on 10/01/2021  Annual Physical due on 12/02/2021    Patient/Caregiver Education: Patient/Caregiver Education: There are no barriers to learning. Medical education done.   Outcome: Patient verbalizes understanding. Patient is notified to call with any questions, complications, allergies, or worsening or changing symptoms.  Patient is to call with any side effects or complications from the treatments as a result of today.     Problem List:  Patient Active Problem List   Diagnosis    Essential hypertension, benign    Chronic depressive personality disorder    GERD (gastroesophageal reflux disease)    Vitamin B 12 deficiency    Colon polyps    Family history of colon cancer, maternal aunt    Chronic kidney disease, stage 3a (HCC)    Primary osteoarthritis involving multiple joints    Mixed hyperlipidemia    Adult form of gluten-sensitive enteropathy    Elevated coronary artery calcium score       ANTONIO  FRED MOSES, DO    Please note that portions of this note may have been completed with a voice recognition program. Efforts were made to edit the dictations but occasionally words are mis-transcribed.

## 2024-07-05 ENCOUNTER — HOSPITAL ENCOUNTER (OUTPATIENT)
Dept: ULTRASOUND IMAGING | Age: 74
Discharge: HOME | End: 2024-07-05
Attending: FAMILY MEDICINE
Payer: MEDICARE

## 2024-07-05 DIAGNOSIS — N95.8 SIMPLE ADNEXAL CYST GREATER THAN 1 CM IN DIAMETER IN POSTMENOPAUSAL PATIENT: ICD-10-CM

## 2024-07-05 DIAGNOSIS — N94.89 SIMPLE ADNEXAL CYST GREATER THAN 1 CM IN DIAMETER IN POSTMENOPAUSAL PATIENT: ICD-10-CM

## 2024-07-05 DIAGNOSIS — R10.31 ABDOMINAL PAIN, RLQ: ICD-10-CM

## 2024-07-05 PROCEDURE — 76830 TRANSVAGINAL US NON-OB: CPT | Performed by: FAMILY MEDICINE

## 2024-07-05 PROCEDURE — 76856 US EXAM PELVIC COMPLETE: CPT | Performed by: FAMILY MEDICINE

## 2024-07-05 PROCEDURE — 93975 VASCULAR STUDY: CPT | Performed by: FAMILY MEDICINE

## 2024-07-11 ENCOUNTER — PATIENT MESSAGE (OUTPATIENT)
Dept: FAMILY MEDICINE CLINIC | Facility: CLINIC | Age: 74
End: 2024-07-11

## 2024-07-11 ENCOUNTER — OFFICE VISIT (OUTPATIENT)
Dept: OBGYN CLINIC | Facility: CLINIC | Age: 74
End: 2024-07-11
Payer: MEDICARE

## 2024-07-11 ENCOUNTER — TELEPHONE (OUTPATIENT)
Dept: OBGYN CLINIC | Facility: CLINIC | Age: 74
End: 2024-07-11

## 2024-07-11 DIAGNOSIS — N83.202 BILATERAL OVARIAN CYSTS: ICD-10-CM

## 2024-07-11 DIAGNOSIS — Z78.0 ASYMPTOMATIC POSTMENOPAUSAL STATUS: Primary | ICD-10-CM

## 2024-07-11 DIAGNOSIS — N83.201 BILATERAL OVARIAN CYSTS: ICD-10-CM

## 2024-07-11 DIAGNOSIS — N83.202 BILATERAL OVARIAN CYSTS: Primary | ICD-10-CM

## 2024-07-11 DIAGNOSIS — N83.201 BILATERAL OVARIAN CYSTS: Primary | ICD-10-CM

## 2024-07-11 PROCEDURE — 99204 OFFICE O/P NEW MOD 45 MIN: CPT | Performed by: OBSTETRICS & GYNECOLOGY

## 2024-07-11 NOTE — PROGRESS NOTES
[facility]  Obstetrics and Gynecology  Gynecology Visit    Chief Complaint   Patient presents with    Gyn Exam           Cynthia Snell is a 74 year old female who presents for gyne exam.    LMP: Postmenopausal.    Menses regular: n/a.    Menstrual flow normal: n/a.    Birth control or HRT:  n/a.   Refill n/a  Last Pap Smear: 18 .  Any history of abnormal paps: n/a   Last MM2024  Any Medication Refills needed today?: none  Sleep: 7-8 hours.    Diet:  balanced, but have been feeling full and does not eat much  Exercise: 5x a week  Screening labs/Blood work today: n/a.     Colonoscopy (if over 46 y/o): 222.   Gardasil:(age 9-46 y/o) n/a.   Genetic Cancer screen (if indicated): n/a.   Flu (Aug-April): n/a.TDAP (every 10 years) 2012.      Additional Problems/concerns: Cysts in ovaries causing pain, would like to have them removed.      Next Appt: 25     Immunization History   Administered Date(s) Administered    Covid-19 Vaccine Moderna 100 mcg/0.5 ml 2021, 2021, 2021    Covid-19 Vaccine Moderna 50 Mcg/0.25 Ml 2022    Covid-19 Vaccine Pfizer Bivalent 30mcg/0.3mL 2022    FLU VAC High Dose 65 YRS & Older PRSV Free (42879) 10/09/2018, 10/12/2020, 2021    FLUAD High Dose 65 yr and older (69431) 10/02/2019    FLUZONE 6 months and older PFS 0.5 ml (63071) 10/11/2017    Fluvirin, 3 Years & >, Im 10/02/2013, 2014    Fluzone Vaccine Medicare () 10/18/2016, 10/08/2018    HIGH DOSE FLU 65 YRS AND OLDER PRSV FREE SINGLE D (34520) FLU CLINIC 10/19/2016, 2022, 10/23/2023    Influenza 10/15/2008, 10/17/2009, 10/10/2010, 2012, 10/01/2013, 2014, 10/28/2015    Moderna Covid-19 Vaccine 50mcg/0.5ml 12yrs+ (7326-6633) 10/09/2023    Pneumococcal (Prevnar 13) 2016    Pneumococcal Conjugate PCV20 2023    Pneumovax 23 10/11/2017    RSV, bivalent, diluent reconstituted PF (Abrysvo) 2023    TDAP 2006, 2012     Zoster Vaccine Live (Zostavax) 09/22/2012    Zoster Vaccine Recombinant Adjuvanted (Shingrix) 04/06/2019, 10/02/2019         Current Outpatient Medications:     atorvastatin 10 MG Oral Tab, Take 1 tablet (10 mg total) by mouth nightly., Disp: 90 tablet, Rfl: 1    nabumetone 500 MG Oral Tab, Take 1 tablet (500 mg total) by mouth daily., Disp: 90 tablet, Rfl: 1    losartan 50 MG Oral Tab, TAKE 1 TABLET(50 MG) BY MOUTH DAILY, Disp: 90 tablet, Rfl: 1    citalopram 20 MG Oral Tab, TAKE 1 TABLET BY MOUTH EVERY DAY, Disp: 90 tablet, Rfl: 1    hydrocortisone 2.5 % External Cream, Apply 1 Application. topically 2 (two) times daily. Apply to eyebrows and cheeks, Disp: 30 g, Rfl: 5    fexofenadine 180 MG Oral Tab, Take 1 tablet (180 mg total) by mouth daily., Disp: , Rfl:     Nitrofurantoin Macrocrystal 50 MG Oral Cap, 1 CAPSULE before intercourse, Disp: 50 capsule, Rfl: 1    LUTEIN OR, Take by mouth daily., Disp: , Rfl:     Multiple Vitamins-Minerals (MACUVITE/LUTEIN) Oral Tab, Take 1 tablet by mouth daily., Disp: , Rfl:     Naproxen Sodium 220 MG Oral Cap, Take 220 mg by mouth daily., Disp: , Rfl:     Vitamin B-12 (VITAMIN B12) 500 MCG Oral Tab, Take 1 tablet (500 mcg total) by mouth twice a week., Disp: 30 tablet, Rfl: 11    Calcium Polycarbophil (FIBERCON OR), Take  by mouth., Disp: , Rfl:     Omeprazole Magnesium 20 MG Oral Tab EC, Take 1 tablet (20 mg total) by mouth 2 (two) times daily., Disp: 60 tablet, Rfl: 11    CALCIUM 500 OR, qd, Disp: , Rfl:     azithromycin (ZITHROMAX Z-KYMBERLY) 250 MG Oral Tab, Take 1 by oral route every day for 5 days. 2 tablets today. (Patient not taking: Reported on 6/3/2024), Disp: 6 tablet, Rfl: 0    predniSONE 5 MG Oral Tab, Take 1 tablet (5 mg total) by mouth daily. (Patient not taking: Reported on 6/3/2024), Disp: 7 tablet, Rfl: 0    mupirocin 2 % External Ointment, Apply 1 Application topically 2 (two) times daily. Apply to nose twice daily (Patient not taking: Reported on 6/21/2024), Disp:  22 g, Rfl: 0    betamethasone dipropionate 0.05 % External Lotion, Apply 1 mL topically 2 (two) times daily. Apply to scalp (Patient not taking: Reported on 2024), Disp: 60 mL, Rfl: 5    Allergies   Allergen Reactions    Hydrocodone DIZZINESS    Grass OTHER (SEE COMMENTS)     Sinus symptoms    Codeine NAUSEA ONLY     Nausea         OB History    Para Term  AB Living   4 3 3 0 1 3   SAB IAB Ectopic Multiple Live Births   1 0 0 0 0      # Outcome Date GA Lbr Davi/2nd Weight Sex Type Anes PTL Lv   4 SAB            3 Term            2 Term            1 Term                Past Medical History:    Abnormal screening CT of heart    calcium score 28    Acquired ptosis of both eyelids    Allergic rhinitis due to pollen (aka 4770)    Anxiety state, unspecified    Bilateral senile cataracts    Cancer (HCC)    basal cell    Chondral loose body of left knee joint    Degenerative tear of lateral meniscus, left    Degenerative tear of posterior horn of medial meniscus, left    Dysthymic disorder    Esophageal reflux    Essential hypertension, benign    Family history of colonic polyps, mother    High blood pressure    High cholesterol    History of basal cell cancer    skin, neck    IBS (irritable bowel syndrome)    Mitral valve prolapse    Osteoarthrosis, unspecified whether generalized or localized, unspecified site    hands knees    Personal history of colonic polyps    Plantar fasciitis of right foot    Primary osteoarthritis of left knee    Ptosis, myogenic, left    Added automatically from request for surgery [id number]    Ptosis, myogenic, right    Added automatically from request for surgery [id number]    Pure hypercholesterolemia    Sleep apnea    Tear of medial meniscus of left knee    surgery scheduled 19    Tear of medial meniscus of left knee  Global 2019    Unspecified internal derangement of knee    right knee    Vertical strabismus, left eye    Added automatically from request for surgery  [id number]       Past Surgical History:   Procedure Laterality Date    Anesth,shoulder replacement Right 2/6/2015    right reverse shoulder replacement    Cataract extraction w/  intraocular lens implant Right 01/2020    Cataract extraction w/  intraocular lens implant Left 02/2020    Colonoscopy  3/8/2005    normal    Colonoscopy  2/5/2009    normal    Colonoscopy  08/19/2014    Dr. Vazquez- Repeat in 3 years    Colonoscopy N/A 09/11/2018    diverticulosis     Colonoscopy N/A 12/8/2022    Procedure: COLONOSCOPY with FORCEP POLYPECTOMY;  Surgeon: Bora Vazquez MD;  Location:  ENDOSCOPY    Ct heart w/ calcium scoring  2/2/2005    calcium score of 0    D & c  1988    Exc skin malig 0.6-1cm trunk,arm,leg  2009    skin BCC neck    Exc skin malig 2.1-3cm trunk,arm,leg Left 7/2010    left neck basal cell cancer    Excis urethral caruncle  1982    Eye muscle surg proc unlisted Left 05/29/2020    Left inferior oblique myectomy    Knee scope,med+lat menis repair Right 1995    right knee arthrscopy    Knee scope,med+lat menis repair Left 04/19/2019    medial and lateral meniscal repairs, loose body    Peripheral vascular screening historical conv Bilateral 5/9/2016    mild left carotid narrowing, PAD screen       Family History   Problem Relation Age of Onset    Heart Surgery Father         valve replacement    Infectious Disease Father         endocarditis    Heart Attack Father     Kidney Disease Father     Arthritis Mother     Gastro-Intestinal Disorder Mother         diverticulosis, bleeding    Infectious Disease Mother         pneumonia    Stroke Mother         hemiparesis, aphasia    Clotting Disorder Mother         Pe    Colon Cancer Mother     Infectious Disease Daughter         viral meningitis    Obesity Sister     Hypertension Sister     Infectious Disease Sister         MRSA    Pulmonary Disease Sister         on O2    Heart Attack Sister     Stroke Sister         nursing home    Substance Abuse Brother     Breast  Cancer Sister 53        53    Obesity Sister     Neurological Disorder Sister         MS    BRCA gene + Sister     Diabetes Other     Thyroid Disorder Daughter     Neurological Disorder Daughter         headaches    Diabetes Other     Colon Cancer Other     Obesity Sister         Tobacco  Allergies  Meds  Med Hx  Surg Hx  Fam Hx  Soc Hx        Social History     Socioeconomic History    Marital status:      Spouse name: Aldo    Number of children: 3    Years of education: 18    Highest education level: Not on file   Occupational History    Occupation: Office Work/Adminstrative     Comment: works for    Tobacco Use    Smoking status: Never    Smokeless tobacco: Never   Vaping Use    Vaping status: Never Used   Substance and Sexual Activity    Alcohol use: Yes     Alcohol/week: 5.0 standard drinks of alcohol     Types: 5 Standard drinks or equivalent per week    Drug use: No    Sexual activity: Yes     Partners: Male   Other Topics Concern     Service No    Blood Transfusions No    Caffeine Concern Yes     Comment: 2 cups coffee/day    Occupational Exposure No    Hobby Hazards No    Sleep Concern No    Stress Concern No    Weight Concern No    Special Diet No    Back Care No    Exercise Yes     Comment: 5x/week water aerobics, , barre class, walking    Bike Helmet Yes    Seat Belt Yes    Self-Exams Yes   Social History Narrative    Lives with     Enjoys hiking, bicycling    Volunteer at [employer] (2018)     Social Determinants of Health     Financial Resource Strain: Not on file   Food Insecurity: Not on file   Transportation Needs: Not on file   Physical Activity: Not on file   Stress: Not on file   Social Connections: Not on file   Housing Stability: Not on file       There were no vitals taken for this visit.  Wt Readings from Last 3 Encounters:   06/21/24 203 lb (92.1 kg)   06/03/24 205 lb (93 kg)   12/08/23 200 lb (90.7 kg)     Health Maintenance   Topic  Date Due    COVID-19 Vaccine (7 - 2023-24 season) 02/09/2024    Influenza Vaccine (1) 10/01/2024    Annual Physical  12/08/2024    Mammogram  02/05/2025    Colorectal Cancer Screening  12/08/2025    DEXA Scan  Completed    Annual Depression Screening  Completed    Fall Risk Screening (Annual)  Completed    Pneumococcal Vaccine: 65+ Years  Completed    Zoster Vaccines  Completed           Review of Systems   General: Present- Feeling well. Not Present- Chills, Fever, Weight Gain and Weight Loss.  HEENT: Not Present- Headache and Sore Throat.  Respiratory: Not Present- Cough, Difficulty Breathing, Hemoptysis and Sputum Production.  Breast: Not Present- Breast Mass, Breast Pain and Nipple Discharge.  Cardiovascular: Not Present- Chest Pain, Elevated Blood Pressure, Fainting / Blacking Out and Shortness of Breath.  Gastrointestinal: Not Present- Bloody Stool, Change in Bowel Habits, Constipation, Diarrhea, Heartburn,   Female Genitourinary: Not Present- Discharge, Dysuria, Frequency, Incontinence, Urgency, Urinating at Night, Vaginal Bleeding, Vaginal Discharge, Vaginal dryness and Vaginal itching/burning.  Musculoskeletal: Not Present- Leg Cramps and Swelling of Extremities.  Neurological: Not Present- Dizziness and Headaches.  Psychiatric: Not Present- Anxiety and Depression.  Endocrine: Not Present- Appetite Changes, Hair Changes and Thyroid Problems.  Hematology: Not Present- Blood Clots, Easy Bruising and Excessive bleeding.  All other systems negative       Physical Exam   The physical exam findings are as follows:       General   Mental Status - Alert. General Appearance - Cooperative. Orientation - Oriented X4. Build & Nutrition - Well nourished.    Abdomen   Inspection: Inspection of the abdomen reveals - No Hernias. Incisional scars - No incisional scars.  Palpation/Percussion: Palpation and Percussion of the abdomen reveal - Non Tender and No Palpable abdominal masses.  Liver: - Normal.    Neurologic   Mental  Status: - Normal.    Lymphatic  General Lymphatics   Description - Normal .    CT and ultrasound reviewed as part of visit     FINDINGS:    LIVER:  Overall attenuation is decreased consistent with fatty infiltration.  There are 2 focal low-attenuation areas with Hounsfield units reflecting fluid consistent with cysts.  Segment 8 1.1 cm.  Segment 2 3.5 x 2.9 cm, lobulated without enhancing  septations or nodules.  Both are consistent with hepatic cysts.    BILIARY:  Nondistended gallbladder.  No biliary dilatation.  PANCREAS:  Uniform parenchyma.  No ductal dilatation.  SPLEEN:  Not enlarged.  KIDNEYS:  Normal anatomic positions.  No hydronephrosis or perinephric stranding.  Uniform parenchymal enhancement.  ADRENALS:  Not enlarged.  AORTA/VASCULAR:  Mild calcified atherosclerosis.  No abdominal aortic aneurysm.  Patent celiac artery, SMA and ELIANA.  Patent splenic vein and portal vein.  RETROPERITONEUM:  No adenopathy.  BOWEL/MESENTERY:  Normal bowel caliber.  Moderate colonic diverticulosis.  No acute diverticulitis.  Moderate to large amount of stool scattered throughout the colon.  Normal appendix.  No ascites.  ABDOMINAL WALL:  Diastasis recti.  Small fat containing left inguinal hernia.  URINARY BLADDER:  Nondistended.  No calculus within.  PELVIC NODES:  None enlarged.  PELVIC ORGANS:  Bilateral adnexal cysts.  These are circumscribed without enhancing septations or nodules.  Hounsfield units reflect fluid.  Right 5.9 x 4.8 cm.  Left 4.1 x 3.0 cm.  BONES:  Moderate to severe lumbar facet arthropathy, progressive from the upper to lower segments.  A few mm associated L4-L5 anterolisthesis.  Disc degenerative changes are most pronounced at T12-L1, moderate to severe.  Moderate changes at L1-L2 and  L5-S1.  Normal vertebral body heights.  LUNG BASES:  0.4 cm subpleural nodule left lower lobe.  Series 3, image 7.  OTHER:  None.                      Impression   CONCLUSION:    1. Fatty infiltration of the liver.  2.  Two low density foci in the liver consistent with cysts.  3. Nonspecific left lung base pulmonary nodule.  This appears unchanged compared to a prior CT calcium scoring study.  Given the interval of stability it is considered benign.  4. Bilateral adnexal cysts.  These are likely ovarian in nature.  Although CT features appear simple, they are considered abnormal in a postmenopausal woman.  Full characterization with follow-up pelvic sonography recommended.  5. Small hiatus hernia.  6. Details as above.  Continued clinical correlation recommended.          FINDINGS:     PELVIS    UTERUS:  Unremarkable appearance.  RIGHT OVARY:  The right ovary reveals thinly septated cyst measuring 6.2 x 6.7 x 4.6 cm.  This cyst is otherwise anechoic and simple in nature.  No abnormal vascularity.  LEFT OVARY:  Left ovary reveals a simple anechoic cyst measuring 4.0 x 3.0 x 2.6 cm.  No abnormal vascularity.     CUL-DE-SAC:  Negative.     DOPPLER WAVE FORMS  FLOW:  There is normal arterial and venous Doppler wave forms in both ovaries.  The spectral analysis is within normal limits.  OTHER:  Negative.                   Impression   CONCLUSION:  No acute process.  Bilateral ovarian cysts as described above.  Cysts are nonspecific in a postmenopausal female.  As seen, on this exam, the cysts have a nonaggressive appearance, and are likely benign.        1. Asymptomatic postmenopausal status    2. Bilateral ovarian cysts      Reviewed patient's symptoms that are interfering with her very active lifestyle patient desires removal of bilateral ovary ovaries and tubes.  Plan for laparoscopic bilateral salpingo-oophorectomy.  Preop Clearance required.    Patient was provided with informed consent for surgery including a review of the proposed surgery and all possibilities.  A discussion of the risks of the procedure, benefits, side effects, and success were addressed.  Alternative treatments were discussed as well.  All questions were  answered.  Patient is to proceed with surgery.

## 2024-07-11 NOTE — PROGRESS NOTES
[facility]  Obstetrics and Gynecology  Gynecology Visit    No chief complaint on file.          Cynthia Snell is a 74 year old female who presents for ***.    LMP: ***.    Menses regular: ***.    Menstrual flow normal: ***.    Birth control or HRT:  ***.   Refill ***  Last Pap Smear: ***.  Any history of abnormal paps: ***   Last MMG: ***  Any Medication Refills needed today?: ***  Sleep: ***.    Diet: ***.    Exercise: ***.   Screening labs/Blood work today: ***.     Colonoscopy (if over 46 y/o): ***.   Gardasil:(age 9-46 y/o) ***.   Genetic Cancer screen (if indicated): ***.   Flu (Aug-April): ***.TDAP (every 10 years) ***.      Additional Problems/concerns: ***.      Next Appt: ***    Immunization History   Administered Date(s) Administered    Covid-19 Vaccine Moderna 100 mcg/0.5 ml 02/02/2021, 03/02/2021, 11/04/2021    Covid-19 Vaccine Moderna 50 Mcg/0.25 Ml 05/22/2022    Covid-19 Vaccine Pfizer Bivalent 30mcg/0.3mL 09/07/2022    FLU VAC High Dose 65 YRS & Older PRSV Free (40856) 10/09/2018, 10/12/2020, 09/21/2021    FLUAD High Dose 65 yr and older (33703) 10/02/2019    FLUZONE 6 months and older PFS 0.5 ml (80407) 10/11/2017    Fluvirin, 3 Years & >, Im 10/02/2013, 11/05/2014    Fluzone Vaccine Medicare () 10/18/2016, 10/08/2018    HIGH DOSE FLU 65 YRS AND OLDER PRSV FREE SINGLE D (63926) FLU CLINIC 10/19/2016, 09/07/2022, 10/23/2023    Influenza 10/15/2008, 10/17/2009, 10/10/2010, 09/22/2012, 10/01/2013, 11/05/2014, 10/28/2015    Moderna Covid-19 Vaccine 50mcg/0.5ml 12yrs+ (8680-7884) 10/09/2023    Pneumococcal (Prevnar 13) 05/02/2016    Pneumococcal Conjugate PCV20 12/08/2023    Pneumovax 23 10/11/2017    RSV, bivalent, diluent reconstituted PF (Abrysvo) 09/08/2023    TDAP 11/30/2006, 09/22/2012    Zoster Vaccine Live (Zostavax) 09/22/2012    Zoster Vaccine Recombinant Adjuvanted (Shingrix) 04/06/2019, 10/02/2019         Current Outpatient Medications:     atorvastatin 10 MG Oral Tab, Take 1  tablet (10 mg total) by mouth nightly., Disp: 90 tablet, Rfl: 1    nabumetone 500 MG Oral Tab, Take 1 tablet (500 mg total) by mouth daily., Disp: 90 tablet, Rfl: 1    losartan 50 MG Oral Tab, TAKE 1 TABLET(50 MG) BY MOUTH DAILY, Disp: 90 tablet, Rfl: 1    citalopram 20 MG Oral Tab, TAKE 1 TABLET BY MOUTH EVERY DAY, Disp: 90 tablet, Rfl: 1    mupirocin 2 % External Ointment, Apply 1 Application topically 2 (two) times daily., Disp: 22 g, Rfl: 3    azithromycin (ZITHROMAX Z-KYMBERLY) 250 MG Oral Tab, Take 1 by oral route every day for 5 days. 2 tablets today. (Patient not taking: Reported on 6/3/2024), Disp: 6 tablet, Rfl: 0    predniSONE 5 MG Oral Tab, Take 1 tablet (5 mg total) by mouth daily. (Patient not taking: Reported on 6/3/2024), Disp: 7 tablet, Rfl: 0    mupirocin 2 % External Ointment, Apply 1 Application topically 2 (two) times daily. Apply to nose twice daily (Patient not taking: Reported on 6/21/2024), Disp: 22 g, Rfl: 0    hydrocortisone 2.5 % External Cream, Apply 1 Application. topically 2 (two) times daily. Apply to eyebrows and cheeks, Disp: 30 g, Rfl: 5    betamethasone dipropionate 0.05 % External Lotion, Apply 1 mL topically 2 (two) times daily. Apply to scalp (Patient not taking: Reported on 6/21/2024), Disp: 60 mL, Rfl: 5    fexofenadine 180 MG Oral Tab, Take 1 tablet (180 mg total) by mouth daily., Disp: , Rfl:     Nitrofurantoin Macrocrystal 50 MG Oral Cap, 1 CAPSULE before intercourse, Disp: 50 capsule, Rfl: 1    LUTEIN OR, Take by mouth daily., Disp: , Rfl:     Multiple Vitamins-Minerals (MACUVITE/LUTEIN) Oral Tab, Take 1 tablet by mouth daily., Disp: , Rfl:     Naproxen Sodium 220 MG Oral Cap, Take 220 mg by mouth daily., Disp: , Rfl:     Vitamin B-12 (VITAMIN B12) 500 MCG Oral Tab, Take 1 tablet (500 mcg total) by mouth twice a week., Disp: 30 tablet, Rfl: 11    Calcium Polycarbophil (FIBERCON OR), Take  by mouth., Disp: , Rfl:     Omeprazole Magnesium 20 MG Oral Tab EC, Take 1 tablet (20 mg  total) by mouth 2 (two) times daily., Disp: 60 tablet, Rfl: 11    CALCIUM 500 OR, qd, Disp: , Rfl:     Allergies   Allergen Reactions    Hydrocodone DIZZINESS    Grass OTHER (SEE COMMENTS)     Sinus symptoms    Codeine NAUSEA ONLY     Nausea         OB History    Para Term  AB Living   4 3 3 0 1 3   SAB IAB Ectopic Multiple Live Births   1 0 0 0 0      # Outcome Date GA Lbr Davi/2nd Weight Sex Type Anes PTL Lv   4 SAB            3 Term            2 Term            1 Term                Past Medical History:    Abnormal screening CT of heart    calcium score 28    Acquired ptosis of both eyelids    Allergic rhinitis due to pollen (aka 4770)    Anxiety state, unspecified    Bilateral senile cataracts    Cancer (HCC)    basal cell    Chondral loose body of left knee joint    Degenerative tear of lateral meniscus, left    Degenerative tear of posterior horn of medial meniscus, left    Dysthymic disorder    Esophageal reflux    Essential hypertension, benign    Family history of colonic polyps, mother    High blood pressure    High cholesterol    History of basal cell cancer    skin, neck    IBS (irritable bowel syndrome)    Mitral valve prolapse    Osteoarthrosis, unspecified whether generalized or localized, unspecified site    hands knees    Personal history of colonic polyps    Plantar fasciitis of right foot    Primary osteoarthritis of left knee    Ptosis, myogenic, left    Added automatically from request for surgery [id number]    Ptosis, myogenic, right    Added automatically from request for surgery [id number]    Pure hypercholesterolemia    Sleep apnea    Tear of medial meniscus of left knee    surgery scheduled 19    Tear of medial meniscus of left knee  Global 2019    Unspecified internal derangement of knee    right knee    Vertical strabismus, left eye    Added automatically from request for surgery [id number]       Past Surgical History:   Procedure Laterality Date    Anesth,shoulder  replacement Right 2/6/2015    right reverse shoulder replacement    Cataract extraction w/  intraocular lens implant Right 01/2020    Cataract extraction w/  intraocular lens implant Left 02/2020    Colonoscopy  3/8/2005    normal    Colonoscopy  2/5/2009    normal    Colonoscopy  08/19/2014    Dr. Vazquez- Repeat in 3 years    Colonoscopy N/A 09/11/2018    diverticulosis     Colonoscopy N/A 12/8/2022    Procedure: COLONOSCOPY with FORCEP POLYPECTOMY;  Surgeon: Bora Vazquez MD;  Location:  ENDOSCOPY    Ct heart w/ calcium scoring  2/2/2005    calcium score of 0    D & c  1988    Exc skin malig 0.6-1cm trunk,arm,leg  2009    skin BCC neck    Exc skin malig 2.1-3cm trunk,arm,leg Left 7/2010    left neck basal cell cancer    Excis urethral caruncle  1982    Eye muscle surg proc unlisted Left 05/29/2020    Left inferior oblique myectomy    Knee scope,med+lat menis repair Right 1995    right knee arthrscopy    Knee scope,med+lat menis repair Left 04/19/2019    medial and lateral meniscal repairs, loose body    Peripheral vascular screening historical conv Bilateral 5/9/2016    mild left carotid narrowing, PAD screen       Family History   Problem Relation Age of Onset    Heart Surgery Father         valve replacement    Infectious Disease Father         endocarditis    Heart Attack Father     Kidney Disease Father     Arthritis Mother     Gastro-Intestinal Disorder Mother         diverticulosis, bleeding    Infectious Disease Mother         pneumonia    Stroke Mother         hemiparesis, aphasia    Clotting Disorder Mother         Pe    Colon Cancer Mother     Infectious Disease Daughter         viral meningitis    Obesity Sister     Hypertension Sister     Infectious Disease Sister         MRSA    Pulmonary Disease Sister         on O2    Heart Attack Sister     Stroke Sister         nursing home    Substance Abuse Brother     Breast Cancer Sister 53        53    Obesity Sister     Neurological Disorder Sister          MS    BRCA gene + Sister     Diabetes Other     Thyroid Disorder Daughter     Neurological Disorder Daughter         headaches    Diabetes Other     Colon Cancer Other     Obesity Sister                Social History     Socioeconomic History    Marital status:      Spouse name: Aldo    Number of children: 3    Years of education: 18    Highest education level: Not on file   Occupational History    Occupation: Office Work/Adminstrative     Comment: works for    Tobacco Use    Smoking status: Never    Smokeless tobacco: Never   Vaping Use    Vaping status: Never Used   Substance and Sexual Activity    Alcohol use: Yes     Alcohol/week: 5.0 standard drinks of alcohol     Types: 5 Standard drinks or equivalent per week    Drug use: No    Sexual activity: Yes     Partners: Male   Other Topics Concern     Service No    Blood Transfusions No    Caffeine Concern Yes     Comment: 2 cups coffee/day    Occupational Exposure No    Hobby Hazards No    Sleep Concern No    Stress Concern No    Weight Concern No    Special Diet No    Back Care No    Exercise Yes     Comment: 5x/week water aerobics, , barre class, walking    Bike Helmet Yes    Seat Belt Yes    Self-Exams Yes   Social History Narrative    Lives with     Enjoys hiking, bicycling    Volunteer at [employer] (2018)     Social Determinants of Health     Financial Resource Strain: Not on file   Food Insecurity: Not on file   Transportation Needs: Not on file   Physical Activity: Not on file   Stress: Not on file   Social Connections: Not on file   Housing Stability: Not on file

## 2024-07-11 NOTE — TELEPHONE ENCOUNTER
SURGERY:  schedule Laparascopy bilateral salpinoophrectomy     DATE REQUESTED: Aug 5th Have her go first     Hosp Stay: Out Pt     Major/Minor: Minor     Anticipated time: 20 min     Anesthesia:  Gen     ASSIST NEEDED:  Yes     PRE-OP WITH PCP: Yes     DX:  bilateral ovarian cysts       Cathleen John MD

## 2024-07-11 NOTE — TELEPHONE ENCOUNTER
From: Cynthia Snell  To: ANTONIO MOSES  Sent: 7/11/2024 3:05 PM CDT  Subject: Surgery    Please send Dr. John a note stating that I am fit to have surgery (Ovaries removed). I did stop by the office and left this message with Lakeshia.  Thank you for all your help in finding the solution to my pain.  Apple Snell

## 2024-07-12 PROBLEM — N83.202 BILATERAL OVARIAN CYSTS: Status: ACTIVE | Noted: 2024-07-12

## 2024-07-12 PROBLEM — N83.201 BILATERAL OVARIAN CYSTS: Status: ACTIVE | Noted: 2024-07-12

## 2024-07-12 NOTE — TELEPHONE ENCOUNTER
I left the patient a voicemail to call back     Scar confirmed the assist     Surgical case request has been sent

## 2024-07-12 NOTE — TELEPHONE ENCOUNTER
I spoke with the patient, confirmed date and time for procedure. I also sent the patient a minor surgical case letter via [employer]

## 2024-07-17 ENCOUNTER — TELEPHONE (OUTPATIENT)
Dept: FAMILY MEDICINE CLINIC | Facility: CLINIC | Age: 74
End: 2024-07-17

## 2024-07-17 NOTE — TELEPHONE ENCOUNTER
Per  patient needs a pre op appointment. Left voice mail for patient to call the office and schedule. Surgery is scheduled for 8/5/2024.

## 2024-07-18 ENCOUNTER — LAB ENCOUNTER (OUTPATIENT)
Dept: LAB | Age: 74
End: 2024-07-18
Attending: FAMILY MEDICINE
Payer: MEDICARE

## 2024-07-18 DIAGNOSIS — E78.2 MIXED HYPERLIPIDEMIA: ICD-10-CM

## 2024-07-18 LAB
CHOLEST SERPL-MCNC: 172 MG/DL (ref ?–200)
FASTING PATIENT LIPID ANSWER: YES
HDLC SERPL-MCNC: 67 MG/DL (ref 40–59)
LDLC SERPL CALC-MCNC: 92 MG/DL (ref ?–100)
NONHDLC SERPL-MCNC: 105 MG/DL (ref ?–130)
TRIGL SERPL-MCNC: 71 MG/DL (ref 30–149)
VLDLC SERPL CALC-MCNC: 11 MG/DL (ref 0–30)

## 2024-07-18 PROCEDURE — 80061 LIPID PANEL: CPT

## 2024-07-19 ENCOUNTER — OFFICE VISIT (OUTPATIENT)
Dept: FAMILY MEDICINE CLINIC | Facility: CLINIC | Age: 74
End: 2024-07-19
Payer: MEDICARE

## 2024-07-19 VITALS
HEART RATE: 74 BPM | WEIGHT: 204 LBS | OXYGEN SATURATION: 98 % | BODY MASS INDEX: 33.99 KG/M2 | SYSTOLIC BLOOD PRESSURE: 118 MMHG | RESPIRATION RATE: 16 BRPM | DIASTOLIC BLOOD PRESSURE: 80 MMHG | HEIGHT: 65 IN

## 2024-07-19 DIAGNOSIS — G47.33 OSA ON CPAP: ICD-10-CM

## 2024-07-19 DIAGNOSIS — N18.31 CHRONIC KIDNEY DISEASE, STAGE 3A (HCC): ICD-10-CM

## 2024-07-19 DIAGNOSIS — N83.202 BILATERAL OVARIAN CYSTS: ICD-10-CM

## 2024-07-19 DIAGNOSIS — Z01.818 PREOPERATIVE CLEARANCE: Primary | ICD-10-CM

## 2024-07-19 DIAGNOSIS — I10 ESSENTIAL HYPERTENSION, BENIGN: ICD-10-CM

## 2024-07-19 DIAGNOSIS — N83.201 BILATERAL OVARIAN CYSTS: ICD-10-CM

## 2024-07-19 PROCEDURE — 99214 OFFICE O/P EST MOD 30 MIN: CPT | Performed by: FAMILY MEDICINE

## 2024-07-19 PROCEDURE — G2211 COMPLEX E/M VISIT ADD ON: HCPCS | Performed by: FAMILY MEDICINE

## 2024-07-19 NOTE — PROGRESS NOTES
[facility] Family Medicine Office Note  Chief Complaint:   Chief Complaint   Patient presents with    Pre-Op Exam       HPI:   This is a 74 year old female coming in for preop clearance for laparoscopic bilateral salpingo-oophorectomy with Dr. Cathleen John at [facility] on 8/5/24.  Patient denies any complications related to surgery or anesthesia.  She is taking ASA but will stop 7 days prior to surgery.  She has h/o DARREN and is using CPAP every night.  She is not having any chest pain or SOB.  Denies any fever or cough.  She is a nonsmoker.  Has h/o HTN that is well controlled with medication.      Past Medical History:    Abnormal screening CT of heart    calcium score 28    Acquired ptosis of both eyelids    Allergic rhinitis due to pollen (aka 4770)    Anxiety state, unspecified    Bilateral senile cataracts    Cancer (HCC)    basal cell    Chondral loose body of left knee joint    Degenerative tear of lateral meniscus, left    Degenerative tear of posterior horn of medial meniscus, left    Dysthymic disorder    Esophageal reflux    Essential hypertension, benign    Family history of colonic polyps, mother    High blood pressure    High cholesterol    History of basal cell cancer    skin, neck    IBS (irritable bowel syndrome)    Mitral valve prolapse    Osteoarthrosis, unspecified whether generalized or localized, unspecified site    hands knees    Personal history of colonic polyps    Plantar fasciitis of right foot    Primary osteoarthritis of left knee    Ptosis, myogenic, left    Added automatically from request for surgery [id number]    Ptosis, myogenic, right    Added automatically from request for surgery [id number]    Pure hypercholesterolemia    Sleep apnea    Tear of medial meniscus of left knee    surgery scheduled 04/19/19    Tear of medial meniscus of left knee  Global 07/18/2019    Unspecified internal derangement of knee    right knee    Vertical strabismus, left eye    Added  automatically from request for surgery [id number]     Past Surgical History:   Procedure Laterality Date    Anesth,shoulder replacement Right 2/6/2015    right reverse shoulder replacement    Cataract extraction w/  intraocular lens implant Right 01/2020    Cataract extraction w/  intraocular lens implant Left 02/2020    Colonoscopy  3/8/2005    normal    Colonoscopy  2/5/2009    normal    Colonoscopy  08/19/2014    Dr. Vazquez- Repeat in 3 years    Colonoscopy N/A 09/11/2018    diverticulosis     Colonoscopy N/A 12/8/2022    Procedure: COLONOSCOPY with FORCEP POLYPECTOMY;  Surgeon: Bora Vazquez MD;  Location:  ENDOSCOPY    Ct heart w/ calcium scoring  2/2/2005    calcium score of 0    D & c  1988    Exc skin malig 0.6-1cm trunk,arm,leg  2009    skin BCC neck    Exc skin malig 2.1-3cm trunk,arm,leg Left 7/2010    left neck basal cell cancer    Excis urethral caruncle  1982    Eye muscle surg proc unlisted Left 05/29/2020    Left inferior oblique myectomy    Knee scope,med+lat menis repair Right 1995    right knee arthrscopy    Knee scope,med+lat menis repair Left 04/19/2019    medial and lateral meniscal repairs, loose body    Peripheral vascular screening historical conv Bilateral 5/9/2016    mild left carotid narrowing, PAD screen     Social History:  Social History     Socioeconomic History    Marital status:      Spouse name: Aldo    Number of children: 3    Years of education: 18   Occupational History    Occupation: Office Work/Adminstrative     Comment: works for    Tobacco Use    Smoking status: Never    Smokeless tobacco: Never   Vaping Use    Vaping status: Never Used   Substance and Sexual Activity    Alcohol use: Yes     Alcohol/week: 5.0 standard drinks of alcohol     Types: 5 Standard drinks or equivalent per week    Drug use: No    Sexual activity: Yes     Partners: Male   Other Topics Concern     Service No    Blood Transfusions No    Caffeine Concern Yes     Comment: 2 cups  coffee/day    Occupational Exposure No    Hobby Hazards No    Sleep Concern No    Stress Concern No    Weight Concern No    Special Diet No    Back Care No    Exercise Yes     Comment: 5x/week water aerobics, , barre class, walking    Bike Helmet Yes    Seat Belt Yes    Self-Exams Yes   Social History Narrative    Lives with     Enjoys hiking, bicycling    Volunteer at [employer] (2018)     Family History:  Family History   Problem Relation Age of Onset    Heart Surgery Father         valve replacement    Infectious Disease Father         endocarditis    Heart Attack Father     Kidney Disease Father     Arthritis Mother     Gastro-Intestinal Disorder Mother         diverticulosis, bleeding    Infectious Disease Mother         pneumonia    Stroke Mother         hemiparesis, aphasia    Clotting Disorder Mother         Pe    Colon Cancer Mother     Infectious Disease Daughter         viral meningitis    Obesity Sister     Hypertension Sister     Infectious Disease Sister         MRSA    Pulmonary Disease Sister         on O2    Heart Attack Sister     Stroke Sister         nursing home    Substance Abuse Brother     Breast Cancer Sister 53        53    Obesity Sister     Neurological Disorder Sister         MS    BRCA gene + Sister     Diabetes Other     Thyroid Disorder Daughter     Neurological Disorder Daughter         headaches    Diabetes Other     Colon Cancer Other     Obesity Sister      Allergies:  Allergies   Allergen Reactions    Hydrocodone DIZZINESS    Grass OTHER (SEE COMMENTS)     Sinus symptoms    Codeine NAUSEA ONLY     Nausea       Current Meds:  Current Outpatient Medications   Medication Sig Dispense Refill    atorvastatin 10 MG Oral Tab Take 1 tablet (10 mg total) by mouth nightly. 90 tablet 1    nabumetone 500 MG Oral Tab Take 1 tablet (500 mg total) by mouth daily. 90 tablet 1    losartan 50 MG Oral Tab TAKE 1 TABLET(50 MG) BY MOUTH DAILY 90 tablet 1    citalopram 20  MG Oral Tab TAKE 1 TABLET BY MOUTH EVERY DAY 90 tablet 1    azithromycin (ZITHROMAX Z-KYMBERLY) 250 MG Oral Tab Take 1 by oral route every day for 5 days. 2 tablets today. 6 tablet 0    mupirocin 2 % External Ointment Apply 1 Application topically 2 (two) times daily. Apply to nose twice daily 22 g 0    hydrocortisone 2.5 % External Cream Apply 1 Application. topically 2 (two) times daily. Apply to eyebrows and cheeks 30 g 5    fexofenadine 180 MG Oral Tab Take 1 tablet (180 mg total) by mouth daily.      Nitrofurantoin Macrocrystal 50 MG Oral Cap 1 CAPSULE before intercourse 50 capsule 1    LUTEIN OR Take by mouth daily.      Multiple Vitamins-Minerals (MACUVITE/LUTEIN) Oral Tab Take 1 tablet by mouth daily.      Naproxen Sodium 220 MG Oral Cap Take 220 mg by mouth daily.      Vitamin B-12 (VITAMIN B12) 500 MCG Oral Tab Take 1 tablet (500 mcg total) by mouth twice a week. 30 tablet 11    Calcium Polycarbophil (FIBERCON OR) Take  by mouth.      Omeprazole Magnesium 20 MG Oral Tab EC Take 1 tablet (20 mg total) by mouth 2 (two) times daily. 60 tablet 11    CALCIUM 500 OR qd      predniSONE 5 MG Oral Tab Take 1 tablet (5 mg total) by mouth daily. (Patient not taking: Reported on 6/3/2024) 7 tablet 0    betamethasone dipropionate 0.05 % External Lotion Apply 1 mL topically 2 (two) times daily. Apply to scalp (Patient not taking: Reported on 6/21/2024) 60 mL 5      Counseling given: Not Answered       REVIEW OF SYSTEMS:   ROS:  CONSTITUTIONAL:  Denies any unusual weight gain/loss, fever, chills, weakness or fatigue.  HEENT:  Eyes:  Denies visual loss, blurred vision, double vision or yellow sclerae. Ears, Nose, Throat:  Denies hearing loss, sneezing, congestion, runny nose or sore throat.  CARDIOVASCULAR:  Denies chest pain, chest pressure or chest discomfort. No palpitations or edema.  Denies any dyspnea on exertion or at rest  RESPIRATORY:  Denies shortness of breath, cough  GASTROINTESTINAL:  + bilateral lower abdominal  pain, denies nausea, vomiting  NEUROLOGICAL:  Denies headache, dizziness, syncope, numbness or tingling in the extremities.  MUSCULOSKELETAL:  Denies muscle, back pain, joint pain or stiffness.  HEMATOLOGIC:  Denies anemia    EXAM:   /80   Pulse 74   Resp 16   Ht 5' 5\" (1.651 m)   Wt 204 lb (92.5 kg)   SpO2 98%   BMI 33.95 kg/m²  Estimated body mass index is 33.95 kg/m² as calculated from the following:    Height as of this encounter: 5' 5\" (1.651 m).    Weight as of this encounter: 204 lb (92.5 kg).   Vital signs reviewed.Appears stated age, well groomed.  Physical Exam:  GEN:  Patient is alert and oriented x3, no apparent distress  HEAD:  Normocephalic, atraumatic  HEENT:  Eyes: EOMI, PERRLA, no scleral icterus, conjunctivae clear bilaterally.  Ears: TM's clear and visible bilaterally, no excess cerumen or erythema.  Throat:  No tonsillar erythema or exudate.  Mouth:  No oral lesions or ulcerations, no dental abnormalities noted.  NECK:  Supple, no LAD  LUNGS: clear to auscultation bilaterally, no rales/rhonchi/wheezing  HEART:  Regular rate and rhythm, no murmurs, rubs or gallops  ABDOMEN:  Soft, nondistended, nontender, bowel sounds normal in all 4 quadrants, no hepatosplenomegaly  EXTREMITIES:  Strength intact with 5/5 bilaterally upper and lower extremities, no edema noted  NEURO:  CN 2 - 12 grossly intact     ASSESSMENT AND PLAN:   1. Preoperative clearance  -  based on stable labs and physical exam, patient is medically optimized for laparoscopic bilateral salpingo-oophorectomy with Dr. Cathleen John at [facility] on August 5, 2024  -  avoid nsaids 7 days prior to surgery    2. Bilateral ovarian cysts  -  patient to undergo above procedure  -  further recs per OBGYN    3. Essential hypertension, benign  -  controlled  -  renal function stable  -  cpm    4. Chronic kidney disease, stage 3a (HCC)  -  stable    5. DARREN on CPAP  -  stable      Meds & Refills for this Visit:  Requested  Prescriptions      No prescriptions requested or ordered in this encounter       Health Maintenance:  Health Maintenance Due   Topic Date Due    COVID-19 Vaccine (7 - 2023-24 season) 02/09/2024       Patient/Caregiver Education: Patient/Caregiver Education: There are no barriers to learning. Medical education done.   Outcome: Patient verbalizes understanding. Patient is notified to call with any questions, complications, allergies, or worsening or changing symptoms.  Patient is to call with any side effects or complications from the treatments as a result of today.     Problem List:  Patient Active Problem List   Diagnosis    Essential hypertension, benign    Chronic depressive personality disorder    GERD (gastroesophageal reflux disease)    Vitamin B 12 deficiency    Colon polyps    Family history of colon cancer, maternal aunt    Chronic kidney disease, stage 3a (HCC)    Primary osteoarthritis involving multiple joints    Mixed hyperlipidemia    Adult form of gluten-sensitive enteropathy    Elevated coronary artery calcium score    Bilateral ovarian cysts       ANTONIO MOSES, DO    Please note that portions of this note may have been completed with a voice recognition program. Efforts were made to edit the dictations but occasionally words are mis-transcribed.

## 2024-07-30 NOTE — H&P
[facility]  part of [facility]        HISTORY AND PHYSICAL        Subjective   Chief Complaint:  Pelvic pain       History of Present Illness:    Cynthia Snell is a  74 year old y/o  who presents for scheduled gyn procedure  Laparoscopic bilateral Salping-oophorectomy.  The patients complaints include pelvic pain and bilateral ovarian cysts in a post menopausal woman.          Past Medical History:    Abnormal screening CT of heart    calcium score 28    Acquired ptosis of both eyelids    Allergic rhinitis due to pollen (aka 4770)    Anxiety state, unspecified    Bilateral senile cataracts    Cancer (HCC)    basal cell    Cataract    Chondral loose body of left knee joint    Degenerative tear of lateral meniscus, left    Degenerative tear of posterior horn of medial meniscus, left    Dysthymic disorder    Esophageal reflux    Essential hypertension, benign    Family history of colonic polyps, mother    High blood pressure    High cholesterol    History of basal cell cancer    skin, neck    IBS (irritable bowel syndrome)    Mitral valve prolapse    Osteoarthrosis, unspecified whether generalized or localized, unspecified site    hands knees    Personal history of colonic polyps    Plantar fasciitis of right foot    Pneumonia due to organism    Primary osteoarthritis of left knee    Ptosis, myogenic, left    Added automatically from request for surgery [id number]    Ptosis, myogenic, right    Added automatically from request for surgery [id number]    Pure hypercholesterolemia    Sleep apnea    Tear of medial meniscus of left knee    surgery scheduled 19    Tear of medial meniscus of left knee  Global 2019    Unspecified internal derangement of knee    right knee    Vertical strabismus, left eye    Added automatically from request for surgery [id number]    Visual impairment       Past Surgical History:   Procedure Laterality Date    Anesth,shoulder replacement Right 2015     right reverse shoulder replacement    Cataract extraction w/  intraocular lens implant Right 2020    Cataract extraction w/  intraocular lens implant Left 2020    Colonoscopy  3/8/2005    normal    Colonoscopy  2009    normal    Colonoscopy  2014    Dr. Vazquez- Repeat in 3 years    Colonoscopy N/A 2018    diverticulosis     Colonoscopy N/A 2022    Procedure: COLONOSCOPY with FORCEP POLYPECTOMY;  Surgeon: Bora Vazquez MD;  Location:  ENDOSCOPY    Ct heart w/ calcium scoring  2005    calcium score of 0    D & c  1988    Exc skin malig 0.6-1cm trunk,arm,leg  2009    skin BCC neck    Exc skin malig 2.1-3cm trunk,arm,leg Left 2010    left neck basal cell cancer    Excis urethral caruncle      Eye muscle surg proc unlisted Left 2020    Left inferior oblique myectomy    Knee scope,med+lat menis repair Right     right knee arthrscopy    Knee scope,med+lat menis repair Left 2019    medial and lateral meniscal repairs, loose body    Peripheral vascular screening historical conv Bilateral 2016    mild left carotid narrowing, PAD screen       OB History    Para Term  AB Living   4 3 3 0 1 3   SAB IAB Ectopic Multiple Live Births   1 0 0 0 0       Allergies   Allergen Reactions    Hydrocodone DIZZINESS    Grass OTHER (SEE COMMENTS)     Sinus symptoms    Codeine NAUSEA ONLY     Nausea           Current Outpatient Medications:     LYSINE OR, Take by mouth., Disp: , Rfl:     atorvastatin 10 MG Oral Tab, Take 1 tablet (10 mg total) by mouth nightly., Disp: 90 tablet, Rfl: 1    nabumetone 500 MG Oral Tab, Take 1 tablet (500 mg total) by mouth daily., Disp: 90 tablet, Rfl: 1    losartan 50 MG Oral Tab, TAKE 1 TABLET(50 MG) BY MOUTH DAILY, Disp: 90 tablet, Rfl: 1    citalopram 20 MG Oral Tab, TAKE 1 TABLET BY MOUTH EVERY DAY (Patient taking differently: Take 1 tablet (20 mg total) by mouth at bedtime. TAKE 1 TABLET BY MOUTH EVERY DAY), Disp: 90 tablet, Rfl: 1     mupirocin 2 % External Ointment, Apply 1 Application topically 2 (two) times daily. Apply to nose twice daily (Patient taking differently: Apply 1 Application topically 2 (two) times daily as needed. Apply to nose twice daily), Disp: 22 g, Rfl: 0    hydrocortisone 2.5 % External Cream, Apply 1 Application. topically 2 (two) times daily. Apply to eyebrows and cheeks, Disp: 30 g, Rfl: 5    fexofenadine 180 MG Oral Tab, Take 1 tablet (180 mg total) by mouth daily., Disp: , Rfl:     LUTEIN OR, Take by mouth daily., Disp: , Rfl:     Multiple Vitamins-Minerals (MACUVITE/LUTEIN) Oral Tab, Take 1 tablet by mouth daily., Disp: , Rfl:     Naproxen Sodium 220 MG Oral Cap, Take 220 mg by mouth daily., Disp: , Rfl:     Vitamin B-12 (VITAMIN B12) 500 MCG Oral Tab, Take 1 tablet (500 mcg total) by mouth once a week., Disp: 30 tablet, Rfl: 11    Calcium Polycarbophil (FIBERCON OR), Take  by mouth., Disp: , Rfl:     Omeprazole Magnesium 20 MG Oral Tab EC, Take 1 tablet (20 mg total) by mouth 2 (two) times daily., Disp: 60 tablet, Rfl: 11    CALCIUM 500 OR, qd, Disp: , Rfl:     betamethasone dipropionate 0.05 % External Lotion, Apply 1 mL topically 2 (two) times daily. Apply to scalp (Patient not taking: Reported on 6/21/2024), Disp: 60 mL, Rfl: 5    Nitrofurantoin Macrocrystal 50 MG Oral Cap, 1 CAPSULE before intercourse, Disp: 50 capsule, Rfl: 1      Family History   Problem Relation Age of Onset    Heart Surgery Father         valve replacement    Infectious Disease Father         endocarditis    Heart Attack Father     Kidney Disease Father     Arthritis Mother     Gastro-Intestinal Disorder Mother         diverticulosis, bleeding    Infectious Disease Mother         pneumonia    Stroke Mother         hemiparesis, aphasia    Clotting Disorder Mother         Pe    Colon Cancer Mother     Infectious Disease Daughter         viral meningitis    Obesity Sister     Hypertension Sister     Infectious Disease Sister         MRSA     Pulmonary Disease Sister         on O2    Heart Attack Sister     Stroke Sister         nursing home    Substance Abuse Brother     Breast Cancer Sister 53        53    Obesity Sister     Neurological Disorder Sister         MS    BRCA gene + Sister     Diabetes Other     Thyroid Disorder Daughter     Neurological Disorder Daughter         headaches    Diabetes Other     Colon Cancer Other     Obesity Sister          REVIEW OF SYSTEMS:   CONSTITUTIONAL: Negative for fever, chills, diaphoresis, weakness, fatigue, weight loss, weight gain.  ALLERGIES: Negative for urticaria, hay fever, angioedema  EYES: Negative for blurry vision, decreased vision, loss of vision, eye pain, diplopia, photophobia, discharge  ENT: Negative for sore throat, nasal congestion, nasal discharge, epistaxis, tinnitus, hearing loss  CARDIOVASCULAR: Negative for chest pain, dyspnea on exertion, orthopnea, paroxysmal nocturnal dyspnea, edema, palpitations  RESPIRATORY: Negative for cough, hemoptysis, shortness of breath, pleuritic chest pain, wheezing  BREAST:  Denies breast mass, breast pain, nipple discharge or nipple pain.  ENDOCRINE: Negative for polydipsia/polyuria, palpitations, skin changes, temperature intolerance, unexpected weight changes  HEME-LYMPH: Negative for swollen lymph nodes, bleeding, bruising  GI: Negative abdominal pain, flank pain, nausea, vomiting, diarrhea, constipation, black stool, blood in stool  : Negative for dysuria, frequency/urgency, hematuria, genital discharge, vaginal bleeding, irregular menses, heavy menses, pelvic pain  NEURO: Negative for dizzy/vertigo, headache, focal weakness, numbness/tingling, speech problems, loss of consciousness, confusion, memory loss  MUSCULOSKELETAL: Negative for back pain, joint pain, joint stiffness, joint swelling, muscle pain, muscle weakness  SKIN: Negative for rash, itching, hives  PSYCH: Negative for anxiety, depression, physical abuse, sexual abuse      PHYSICAL  EXAM:    Ht 5' 7\" (1.702 m)   Wt 199 lb (90.3 kg)   BMI 31.17 kg/m²        General   Mental Status - Alert. General Appearance - Cooperative. Orientation - Oriented X4. Build & Nutrition - Well nourished.    Head and Neck  Thyroid   Gland Characteristics - normal size and consistency.    Chest and Lung Exam   Inspection:   Chest Wall: - Normal.  Percussion:   Quality and Intensity: - Percussion normal.  Palpation: - Palpation normal.  Auscultation:   Breath sounds: - Normal.  Adventitious sounds: - No Adventitious sounds.      Cardiovascular   Auscultation: Rhythm - Regular. Heart Sounds - Normal heart sounds.  Murmurs & Other Heart Sounds: Auscultation of the heart reveals - No Murmurs.      Abdomen   Inspection: Inspection of the abdomen reveals - No Hernias. Incisional scars - No incisional scars.  Palpation/Percussion: Palpation and Percussion of the abdomen reveal - Non Tender and No Palpable abdominal masses.  Liver: - Normal.  Auscultation: Auscultation of the abdomen reveals - Bowel sounds normal.      Female Genitourinary     External Genitalia   Perineum - Normal. Bartholin's Gland - Bilateral - Normal. Clitoris - Normal.  Introitus: Characteristics - No Cystocele, Enterocele or Rectocele. Discharge - None.  Labia Majora: Lesions - Bilateral - None. Characteristics - Bilateral - Normal.  Labia Minora: Lesions - Bilateral - None. Characteristics - Bilateral - Normal.  Urethra: Characteristics - Normal. Discharge - None.  [employer] Gland - Bilateral - Normal.  Vulva: Characteristics - Normal. Lesions - None.    Speculum & Bimanual   Vagina:   Vaginal Wall: - Normal.  Vaginal Lesions - None. Vaginal Mucosa - Normal.  Cervix: Characteristics - No Motion tenderness. Discharge - None.  Uterus: Characteristics - Normal. Position - Midposition.  Adnexa: Characteristics - Bilateral - Normal. Masses - No Adnexal Masses.      Peripheral Vascular   Upper Extremity:   Palpation: - Pulses bilaterally normal.  Lower  Extremity: Inspection - Bilateral - Inspection Normal.  Palpation: Edema - Bilateral - No edema.      Neurologic   Mental Status: - Normal.      Lymphatic  General Lymphatics   Description - Normal .      Lab Results   Component Value Date    WBC 6.2 06/03/2024    HGB 14.5 06/03/2024    HCT 42.0 06/03/2024    .0 06/03/2024    MCV 96.8 06/03/2024    RDW 11.8 06/03/2024     No components found for: \"ABOGROUP\", \"RHTYPE\", \"RUBIGG\"    CT and ultrasound reviewed as part of visit      FINDINGS:    LIVER:  Overall attenuation is decreased consistent with fatty infiltration.  There are 2 focal low-attenuation areas with Hounsfield units reflecting fluid consistent with cysts.  Segment 8 1.1 cm.  Segment 2 3.5 x 2.9 cm, lobulated without enhancing  septations or nodules.  Both are consistent with hepatic cysts.    BILIARY:  Nondistended gallbladder.  No biliary dilatation.  PANCREAS:  Uniform parenchyma.  No ductal dilatation.  SPLEEN:  Not enlarged.  KIDNEYS:  Normal anatomic positions.  No hydronephrosis or perinephric stranding.  Uniform parenchymal enhancement.  ADRENALS:  Not enlarged.  AORTA/VASCULAR:  Mild calcified atherosclerosis.  No abdominal aortic aneurysm.  Patent celiac artery, SMA and ELIANA.  Patent splenic vein and portal vein.  RETROPERITONEUM:  No adenopathy.  BOWEL/MESENTERY:  Normal bowel caliber.  Moderate colonic diverticulosis.  No acute diverticulitis.  Moderate to large amount of stool scattered throughout the colon.  Normal appendix.  No ascites.  ABDOMINAL WALL:  Diastasis recti.  Small fat containing left inguinal hernia.  URINARY BLADDER:  Nondistended.  No calculus within.  PELVIC NODES:  None enlarged.  PELVIC ORGANS:  Bilateral adnexal cysts.  These are circumscribed without enhancing septations or nodules.  Hounsfield units reflect fluid.  Right 5.9 x 4.8 cm.  Left 4.1 x 3.0 cm.  BONES:  Moderate to severe lumbar facet arthropathy, progressive from the upper to lower segments.  A few mm  associated L4-L5 anterolisthesis.  Disc degenerative changes are most pronounced at T12-L1, moderate to severe.  Moderate changes at L1-L2 and  L5-S1.  Normal vertebral body heights.  LUNG BASES:  0.4 cm subpleural nodule left lower lobe.  Series 3, image 7.  OTHER:  None.                      Impression   CONCLUSION:    1. Fatty infiltration of the liver.  2. Two low density foci in the liver consistent with cysts.  3. Nonspecific left lung base pulmonary nodule.  This appears unchanged compared to a prior CT calcium scoring study.  Given the interval of stability it is considered benign.  4. Bilateral adnexal cysts.  These are likely ovarian in nature.  Although CT features appear simple, they are considered abnormal in a postmenopausal woman.  Full characterization with follow-up pelvic sonography recommended.  5. Small hiatus hernia.  6. Details as above.  Continued clinical correlation recommended.           FINDINGS:     PELVIS    UTERUS:  Unremarkable appearance.  RIGHT OVARY:  The right ovary reveals thinly septated cyst measuring 6.2 x 6.7 x 4.6 cm.  This cyst is otherwise anechoic and simple in nature.  No abnormal vascularity.  LEFT OVARY:  Left ovary reveals a simple anechoic cyst measuring 4.0 x 3.0 x 2.6 cm.  No abnormal vascularity.     CUL-DE-SAC:  Negative.     DOPPLER WAVE FORMS  FLOW:  There is normal arterial and venous Doppler wave forms in both ovaries.  The spectral analysis is within normal limits.  OTHER:  Negative.                   Impression   CONCLUSION:  No acute process.  Bilateral ovarian cysts as described above.  Cysts are nonspecific in a postmenopausal female.  As seen, on this exam, the cysts have a nonaggressive appearance, and are likely benign.         1. Asymptomatic postmenopausal status    2. Bilateral ovarian cysts       Reviewed patient's symptoms that are interfering with her very active lifestyle patient desires removal of bilateral ovary ovaries and tubes.  Plan for  laparoscopic bilateral salpingo-oophorectomy.  Preop Clearance required.     Patient was provided with informed consent for surgery including a review of the proposed surgery and all possibilities.  A discussion of the risks of the procedure, benefits, side effects, and success were addressed.  Alternative treatments were discussed as well.  All questions were answered.  Patient is to proceed with surgery.           Assessment and Plan:      Active Problems:    Bilateral ovarian cysts    The patient was counseled regarding surgery and the procedure (Laparoscopic bilateral Salping-oophorectomy) was reviewed at length.   Risks of procedure including bleeding/need for blood transfusion (<1%), infection (5-10%), damage to other organs/bowel/bladder/ureters (<1%),  and anesthesia were reviewed.  Benefits, alternatives, & indications were also discussed.  All questions were answered.  Written information was provided.      Cathleen John MD

## 2024-08-01 NOTE — DISCHARGE INSTRUCTIONS
Over the counter Motrin 600mg every 6 hours as needed for pain alternating with extra strength tylenol every 6 hours -   Call if bleeding soaking a pad in <1 hr or fever >100.6 degrees or pain not resolved with ice or over the counter Motrin.   Follow up appointment in 2-3 weeks.     Remove glue with some Vaseline 5 days after surgery. Then apply Vaseline or similar product daily after shower until post-op appointment.       Cathleen John MD      HOME INSTRUCTIONS  [facility] HOME CARE INSTRUCTIONS: POST-OP ANESTHESIA  The medication that you received for sedation or general anesthesia can last up to 24 hours. Your judgment and reflexes may be altered, even if you feel like your normal self.      We Recommend:   Do not drive any motor vehicle or bicycle   Avoid mowing the lawn, playing sports, or working with power tools/applicances (power saws, electric knives or mixers)   That you have someone stay with you on your first night home   Do not drink alcohol or take sleeping pills or tranquilizers   Do not sign legal documents within 24 hours of your procedure   If you had a nerve block for your surgery, take extra care not to put any pressure on your arm or hand for 24 hours    It is normal:  For you to have a sore throat if you had a breathing tube during surgery (while you were asleep!). The sore throat should get better within 48 hours. You can gargle with warm salt water (1/2 tsp in 4 oz warm water) or use a throat lozenge for comfort  To feel muscle aches or soreness especially in the abdomen, chest or neck. The achy feeling should go away in the next 24 hours  To feel weak, sleepy or \"wiped out\". Your should start feeling better in the next 24 hours.   To experience mild discomforts such as sore lip or tongue, headache, cramps, gas pains or a bloated feeling in your abdomen.   To experience mild back pain or soreness for a day or two if you had spinal or epidural anesthesia.   If you had laparoscopic surgery,  to feel shoulder pain or discomfort on the day of surgery.   For some patients to have nausea after surgery/anesthesia    If you feel nausea or experience vomiting:   Try to move around less.   Eat less than usual or drink only liquids until the next morning   Nausea should resolve in about 24 hours    If you have a problem when you are at home:    Call your surgeons office   Discharge Instructions: After Your Surgery  You’ve just had surgery. During surgery, you were given medicine called anesthesia to keep you relaxed and free of pain. After surgery, you may have some pain or nausea. This is common. Here are some tips for feeling better and getting well after surgery.   Going home  Your healthcare provider will show you how to take care of yourself when you go home. They'll also answer your questions. Have an adult family member or friend drive you home. For the first 24 hours after your surgery:   Don't drive or use heavy equipment.  Don't make important decisions or sign legal papers.  Take medicines as directed.  Don't drink alcohol.  Have someone stay with you, if needed. They can watch for problems and help keep you safe.  Be sure to go to all follow-up visits with your healthcare provider. And rest after your surgery for as long as your provider tells you to.   Coping with pain  If you have pain after surgery, pain medicine will help you feel better. Take it as directed, before pain becomes severe. Also, ask your healthcare provider or pharmacist about other ways to control pain. This might be with heat, ice, or relaxation. And follow any other instructions your surgeon or nurse gives you.      Stay on schedule with your medicine.     Tips for taking pain medicine  To get the best relief possible, remember these points:   Pain medicines can upset your stomach. Taking them with a little food may help.  Most pain relievers taken by mouth need at least 20 to 30 minutes to start to work.  Don't wait till your  pain becomes severe before you take your medicine. Try to time your medicine so that you can take it before starting an activity. This might be before you get dressed, go for a walk, or sit down for dinner.  Constipation is a common side effect of some pain medicines. Call your healthcare provider before taking any medicines such as laxatives or stool softeners to help ease constipation. Also ask if you should skip any foods. Drinking lots of fluids and eating foods such as fruits and vegetables that are high in fiber can also help. Remember, don't take laxatives unless your surgeon has prescribed them.  Drinking alcohol and taking pain medicine can cause dizziness and slow your breathing. It can even be deadly. Don't drink alcohol while taking pain medicine.  Pain medicine can make you react more slowly to things. Don't drive or run machinery while taking pain medicine.  Your healthcare provider may tell you to take acetaminophen to help ease your pain. Ask them how much you're supposed to take each day. Acetaminophen or other pain relievers may interact with your prescription medicines or other over-the-counter (OTC) medicines. Some prescription medicines have acetaminophen and other ingredients in them. Using both prescription and OTC acetaminophen for pain can cause you to accidentally overdose. Read the labels on your OTC medicines with care. This will help you to clearly know the list of ingredients, how much to take, and any warnings. It may also help you not take too much acetaminophen. If you have questions or don't understand the information, ask your pharmacist or healthcare provider to explain it to you before you take the OTC medicine.   Managing nausea  Some people have an upset stomach (nausea) after surgery. This is often because of anesthesia, pain, or pain medicine, less movement of food in the stomach, or the stress of surgery. These tips will help you handle nausea and eat healthy foods as you  get better. If you were on a special food plan before surgery, ask your healthcare provider if you should follow it while you get better. Check with your provider on how your eating should progress. It may depend on the surgery you had. These general tips may help:   Don't push yourself to eat. Your body will tell you when to eat and how much.  Start off with clear liquids and soup. They're easier to digest.  Next try semi-solid foods as you feel ready. These include mashed potatoes, applesauce, and gelatin.  Slowly move to solid foods. Don’t eat fatty, rich, or spicy foods at first.  Don't force yourself to have 3 large meals a day. Instead eat smaller amounts more often.  Take pain medicines with a small amount of solid food, such as crackers or toast. This helps prevent nausea.  When to call your healthcare provider  Call your healthcare provider right away if you have any of these:   You still have too much pain, or the pain gets worse, after taking the medicine. The medicine may not be strong enough. Or there may be a complication from the surgery.  You feel too sleepy, dizzy, or groggy. The medicine may be too strong.  Side effects such as nausea or vomiting. Your healthcare provider may advise taking other medicines to .  Skin changes such as rash, itching, or hives. This may mean you have an allergic reaction. Your provider may advise taking other medicines.  The incision looks different (for instance, part of it opens up).  Bleeding or fluid leaking from the incision site, and weren't told to expect that.  Fever of 100.4°F (38°C) or higher, or as directed by your provider.  Call 911  Call 911 right away if you have:   Trouble breathing  Facial swelling    If you have obstructive sleep apnea   You were given anesthesia medicine during surgery to keep you comfortable and free of pain. After surgery, you may have more apnea spells because of this medicine and other medicines you were given. The spells may last  longer than normal.    At home:  Keep using the continuous positive airway pressure (CPAP) device when you sleep. Unless your healthcare provider tells you not to, use it when you sleep, day or night. CPAP is a common device used to treat obstructive sleep apnea.  Talk with your provider before taking any pain medicine, muscle relaxants, or sedatives. Your provider will tell you about the possible dangers of taking these medicines.  Contact your provider if your sleeping changes a lot even when taking medicines as directed.  Jonnathan last reviewed this educational content on 10/1/2021  © 7500-3113 [employer]. All rights reserved. This information is not intended as a substitute for professional medical care. Always follow your healthcare professional's instructions.

## 2024-08-02 ENCOUNTER — TELEPHONE (OUTPATIENT)
Dept: OBGYN CLINIC | Facility: CLINIC | Age: 74
End: 2024-08-02

## 2024-08-02 NOTE — TELEPHONE ENCOUNTER
Patient called. Is scheduled for procedure this Monday. Patient states she needs to be premedicated. please call.

## 2024-08-05 ENCOUNTER — ANESTHESIA (OUTPATIENT)
Dept: SURGERY | Facility: HOSPITAL | Age: 74
End: 2024-08-05
Payer: MEDICARE

## 2024-08-05 ENCOUNTER — ANESTHESIA EVENT (OUTPATIENT)
Dept: SURGERY | Facility: HOSPITAL | Age: 74
End: 2024-08-05
Payer: MEDICARE

## 2024-08-05 ENCOUNTER — HOSPITAL ENCOUNTER (OUTPATIENT)
Facility: HOSPITAL | Age: 74
Setting detail: HOSPITAL OUTPATIENT SURGERY
Discharge: HOME | End: 2024-08-05
Attending: OBSTETRICS & GYNECOLOGY | Admitting: OBSTETRICS & GYNECOLOGY
Payer: MEDICARE

## 2024-08-05 VITALS
HEIGHT: 67 IN | TEMPERATURE: 98 F | BODY MASS INDEX: 32.07 KG/M2 | HEART RATE: 68 BPM | RESPIRATION RATE: 16 BRPM | WEIGHT: 204.31 LBS | OXYGEN SATURATION: 94 % | SYSTOLIC BLOOD PRESSURE: 147 MMHG | DIASTOLIC BLOOD PRESSURE: 70 MMHG

## 2024-08-05 DIAGNOSIS — N83.201 BILATERAL OVARIAN CYSTS: ICD-10-CM

## 2024-08-05 DIAGNOSIS — N83.202 BILATERAL OVARIAN CYSTS: ICD-10-CM

## 2024-08-05 PROBLEM — N85.8 UTERINE CYST: Status: ACTIVE | Noted: 2024-08-05

## 2024-08-05 PROCEDURE — 88305 TISSUE EXAM BY PATHOLOGIST: CPT | Performed by: OBSTETRICS & GYNECOLOGY

## 2024-08-05 PROCEDURE — 0UT74ZZ RESECTION OF BILATERAL FALLOPIAN TUBES, PERCUTANEOUS ENDOSCOPIC APPROACH: ICD-10-PCS | Performed by: OBSTETRICS & GYNECOLOGY

## 2024-08-05 PROCEDURE — 0UT24ZZ RESECTION OF BILATERAL OVARIES, PERCUTANEOUS ENDOSCOPIC APPROACH: ICD-10-PCS | Performed by: OBSTETRICS & GYNECOLOGY

## 2024-08-05 PROCEDURE — 88307 TISSUE EXAM BY PATHOLOGIST: CPT | Performed by: OBSTETRICS & GYNECOLOGY

## 2024-08-05 RX ORDER — ONDANSETRON 2 MG/ML
4 INJECTION INTRAMUSCULAR; INTRAVENOUS EVERY 6 HOURS PRN
Status: DISCONTINUED | OUTPATIENT
Start: 2024-08-05 | End: 2024-08-05

## 2024-08-05 RX ORDER — SODIUM CHLORIDE, SODIUM LACTATE, POTASSIUM CHLORIDE, CALCIUM CHLORIDE 600; 310; 30; 20 MG/100ML; MG/100ML; MG/100ML; MG/100ML
INJECTION, SOLUTION INTRAVENOUS CONTINUOUS
Status: DISCONTINUED | OUTPATIENT
Start: 2024-08-05 | End: 2024-08-05

## 2024-08-05 RX ORDER — ONDANSETRON 2 MG/ML
INJECTION INTRAMUSCULAR; INTRAVENOUS AS NEEDED
Status: DISCONTINUED | OUTPATIENT
Start: 2024-08-05 | End: 2024-08-05 | Stop reason: SURG

## 2024-08-05 RX ORDER — HYDROMORPHONE HYDROCHLORIDE 1 MG/ML
0.2 INJECTION, SOLUTION INTRAMUSCULAR; INTRAVENOUS; SUBCUTANEOUS EVERY 5 MIN PRN
Status: DISCONTINUED | OUTPATIENT
Start: 2024-08-05 | End: 2024-08-05

## 2024-08-05 RX ORDER — FAMOTIDINE 10 MG/ML
20 INJECTION, SOLUTION INTRAVENOUS ONCE
Status: COMPLETED | OUTPATIENT
Start: 2024-08-05 | End: 2024-08-05

## 2024-08-05 RX ORDER — FAMOTIDINE 20 MG/1
20 TABLET, FILM COATED ORAL ONCE
Status: COMPLETED | OUTPATIENT
Start: 2024-08-05 | End: 2024-08-05

## 2024-08-05 RX ORDER — ACETAMINOPHEN 500 MG
1000 TABLET ORAL ONCE
Status: COMPLETED | OUTPATIENT
Start: 2024-08-05 | End: 2024-08-05

## 2024-08-05 RX ORDER — BUPIVACAINE HYDROCHLORIDE 2.5 MG/ML
INJECTION, SOLUTION EPIDURAL; INFILTRATION; INTRACAUDAL AS NEEDED
Status: DISCONTINUED | OUTPATIENT
Start: 2024-08-05 | End: 2024-08-05 | Stop reason: HOSPADM

## 2024-08-05 RX ORDER — ROCURONIUM BROMIDE 10 MG/ML
INJECTION, SOLUTION INTRAVENOUS AS NEEDED
Status: DISCONTINUED | OUTPATIENT
Start: 2024-08-05 | End: 2024-08-05 | Stop reason: SURG

## 2024-08-05 RX ORDER — METOCLOPRAMIDE HYDROCHLORIDE 5 MG/ML
10 INJECTION INTRAMUSCULAR; INTRAVENOUS EVERY 8 HOURS PRN
Status: DISCONTINUED | OUTPATIENT
Start: 2024-08-05 | End: 2024-08-05

## 2024-08-05 RX ORDER — MORPHINE SULFATE 10 MG/ML
6 INJECTION, SOLUTION INTRAMUSCULAR; INTRAVENOUS EVERY 10 MIN PRN
Status: DISCONTINUED | OUTPATIENT
Start: 2024-08-05 | End: 2024-08-05

## 2024-08-05 RX ORDER — MORPHINE SULFATE 4 MG/ML
4 INJECTION, SOLUTION INTRAMUSCULAR; INTRAVENOUS EVERY 10 MIN PRN
Status: DISCONTINUED | OUTPATIENT
Start: 2024-08-05 | End: 2024-08-05

## 2024-08-05 RX ORDER — LIDOCAINE HYDROCHLORIDE 10 MG/ML
INJECTION, SOLUTION EPIDURAL; INFILTRATION; INTRACAUDAL; PERINEURAL AS NEEDED
Status: DISCONTINUED | OUTPATIENT
Start: 2024-08-05 | End: 2024-08-05 | Stop reason: SURG

## 2024-08-05 RX ORDER — HYDROMORPHONE HYDROCHLORIDE 1 MG/ML
0.6 INJECTION, SOLUTION INTRAMUSCULAR; INTRAVENOUS; SUBCUTANEOUS EVERY 5 MIN PRN
Status: DISCONTINUED | OUTPATIENT
Start: 2024-08-05 | End: 2024-08-05

## 2024-08-05 RX ORDER — KETOROLAC TROMETHAMINE 30 MG/ML
INJECTION, SOLUTION INTRAMUSCULAR; INTRAVENOUS AS NEEDED
Status: DISCONTINUED | OUTPATIENT
Start: 2024-08-05 | End: 2024-08-05 | Stop reason: SURG

## 2024-08-05 RX ORDER — DEXAMETHASONE SODIUM PHOSPHATE 4 MG/ML
VIAL (ML) INJECTION AS NEEDED
Status: DISCONTINUED | OUTPATIENT
Start: 2024-08-05 | End: 2024-08-05 | Stop reason: SURG

## 2024-08-05 RX ORDER — HYDROMORPHONE HYDROCHLORIDE 1 MG/ML
0.4 INJECTION, SOLUTION INTRAMUSCULAR; INTRAVENOUS; SUBCUTANEOUS EVERY 5 MIN PRN
Status: DISCONTINUED | OUTPATIENT
Start: 2024-08-05 | End: 2024-08-05

## 2024-08-05 RX ORDER — MORPHINE SULFATE 4 MG/ML
2 INJECTION, SOLUTION INTRAMUSCULAR; INTRAVENOUS EVERY 10 MIN PRN
Status: DISCONTINUED | OUTPATIENT
Start: 2024-08-05 | End: 2024-08-05

## 2024-08-05 RX ORDER — LABETALOL HYDROCHLORIDE 5 MG/ML
10 INJECTION, SOLUTION INTRAVENOUS EVERY 10 MIN PRN
Status: DISCONTINUED | OUTPATIENT
Start: 2024-08-05 | End: 2024-08-05

## 2024-08-05 RX ORDER — NALOXONE HYDROCHLORIDE 0.4 MG/ML
80 INJECTION, SOLUTION INTRAMUSCULAR; INTRAVENOUS; SUBCUTANEOUS AS NEEDED
Status: DISCONTINUED | OUTPATIENT
Start: 2024-08-05 | End: 2024-08-05

## 2024-08-05 RX ADMIN — ONDANSETRON 4 MG: 2 INJECTION INTRAMUSCULAR; INTRAVENOUS at 08:24:00

## 2024-08-05 RX ADMIN — SODIUM CHLORIDE, SODIUM LACTATE, POTASSIUM CHLORIDE, CALCIUM CHLORIDE: 600; 310; 30; 20 INJECTION, SOLUTION INTRAVENOUS at 07:32:00

## 2024-08-05 RX ADMIN — SODIUM CHLORIDE, SODIUM LACTATE, POTASSIUM CHLORIDE, CALCIUM CHLORIDE: 600; 310; 30; 20 INJECTION, SOLUTION INTRAVENOUS at 07:31:00

## 2024-08-05 RX ADMIN — SODIUM CHLORIDE, SODIUM LACTATE, POTASSIUM CHLORIDE, CALCIUM CHLORIDE: 600; 310; 30; 20 INJECTION, SOLUTION INTRAVENOUS at 08:40:00

## 2024-08-05 RX ADMIN — DEXAMETHASONE SODIUM PHOSPHATE 4 MG: 4 MG/ML VIAL (ML) INJECTION at 07:49:00

## 2024-08-05 RX ADMIN — KETOROLAC TROMETHAMINE 15 MG: 30 INJECTION, SOLUTION INTRAMUSCULAR; INTRAVENOUS at 08:24:00

## 2024-08-05 RX ADMIN — LIDOCAINE HYDROCHLORIDE 50 MG: 10 INJECTION, SOLUTION EPIDURAL; INFILTRATION; INTRACAUDAL; PERINEURAL at 07:38:00

## 2024-08-05 RX ADMIN — ROCURONIUM BROMIDE 40 MG: 10 INJECTION, SOLUTION INTRAVENOUS at 07:39:00

## 2024-08-05 NOTE — ANESTHESIA PROCEDURE NOTES
Airway  Date/Time: 8/5/2024 7:41 AM  Urgency: Elective    Airway not difficult    General Information and Staff    Patient location during procedure: OR  Anesthesiologist: Felipa Priteo MD  Resident/CRNA: Jacqueline Whelan CRNA  Performed: CRNA   Performed by: Jacqueline Whelan CRNA  Authorized by: Felipa Prieto MD      Indications and Patient Condition  Indications for airway management: anesthesia  Spontaneous Ventilation: absent  Sedation level: deep  Preoxygenated: yes  Patient position: sniffing  MILS maintained throughout  Mask difficulty assessment: 2 - vent by mask + OA or adjuvant +/- NMBA  No planned trial extubation    Final Airway Details  Final airway type: endotracheal airway      Successful airway: ETT  Cuffed: yes   Successful intubation technique: Video laryngoscopy (Doran 3)  Facilitating devices/methods: intubating stylet  Endotracheal tube insertion site: oral  Blade: Gerber  Blade size: #3  ETT size (mm): 7.0    Cormack-Lehane Classification: grade IIA - partial view of glottis  Placement verified by: capnometry   Cuff volume (mL): 5  Measured from: teeth  ETT to teeth (cm): 21  Number of attempts at approach: 1  Ventilation between attempts: none  Number of other approaches attempted: 0

## 2024-08-05 NOTE — ANESTHESIA POSTPROCEDURE EVALUATION
Patient: Cynthia Snell    Procedure Summary       Date: 08/05/24 Room / Location: [facility] MAIN OR 09 / [facility] MAIN OR    Anesthesia Start: 0732 Anesthesia Stop: 0847    Procedure: Laparoscopic bilateral salpingo oophorectomy (Bilateral: Abdomen) Diagnosis:       Bilateral ovarian cysts      (Bilateral ovarian cysts [N83.201, N83.202])    Surgeons: Cathleen John MD Anesthesiologist: Felipa Prieto MD    Anesthesia Type: general ASA Status: 3            Anesthesia Type: general    Vitals Value Taken Time   /127 08/05/24 0846   Temp 97.7 °F (36.5 °C) 08/05/24 0846   Pulse 79 08/05/24 0846   Resp 16 08/05/24 0846   SpO2 86 % 08/05/24 0846   Vitals shown include unfiled device data.    EM AN Post Evaluation:   Patient Evaluated in PACU  Patient Participation: complete - patient participated  Level of Consciousness: awake  Pain Score: 0  Pain Management: adequate  Airway Patency:patent  Dental exam unchanged from preop  Yes    Nausea/Vomiting: none  Cardiovascular Status: acceptable  Respiratory Status: acceptable and nasal cannula  Postoperative Hydration acceptable      Jacqueline Whelan CRNA  8/5/2024 8:47 AM

## 2024-08-05 NOTE — ANESTHESIA PREPROCEDURE EVALUATION
Anesthesia PreOp Note    HPI:     Cynthia Snell is a 74 year old female who presents for preoperative consultation requested by: Cathleen John MD    Date of Surgery: 8/5/2024    Procedure(s):  Laparoscopic bilateral salpingo oophorectomy  Indication: Bilateral ovarian cysts [N83.201, N83.202]    Relevant Problems   No relevant active problems       NPO:  Last Liquid Consumption Date: 08/05/24  Last Liquid Consumption Time: 0430 (sips of water with med)  Last Solid Consumption Date: 08/04/24  Last Solid Consumption Time: 2000  Last Liquid Consumption Date: 08/05/24          History Review:  Patient Active Problem List    Diagnosis Date Noted    Bilateral ovarian cysts 07/12/2024    Elevated coronary artery calcium score 12/09/2022    Adult form of gluten-sensitive enteropathy 07/27/2022    Chronic kidney disease, stage 3a (HCC) 12/08/2021    Primary osteoarthritis involving multiple joints 12/08/2021    Mixed hyperlipidemia 12/08/2021    Colon polyps 09/06/2018    Family history of colon cancer, maternal aunt 09/06/2018    Vitamin B 12 deficiency 12/01/2014    GERD (gastroesophageal reflux disease) 06/24/2013    Essential hypertension, benign 11/29/2007    Chronic depressive personality disorder 11/29/2007       Past Medical History:    Abnormal screening CT of heart    calcium score 28    Acquired ptosis of both eyelids    Allergic rhinitis due to pollen (aka 4770)    Anxiety state, unspecified    Bilateral senile cataracts    Cancer (HCC)    basal cell    Cataract    Chondral loose body of left knee joint    Degenerative tear of lateral meniscus, left    Degenerative tear of posterior horn of medial meniscus, left    Dysthymic disorder    Esophageal reflux    Essential hypertension, benign    Family history of colonic polyps, mother    High blood pressure    High cholesterol    History of basal cell cancer    skin, neck    IBS (irritable bowel syndrome)    Mitral valve prolapse    Osteoarthrosis,  unspecified whether generalized or localized, unspecified site    hands knees    Personal history of colonic polyps    Plantar fasciitis of right foot    Pneumonia due to organism    Primary osteoarthritis of left knee    Ptosis, myogenic, left    Added automatically from request for surgery [id number]    Ptosis, myogenic, right    Added automatically from request for surgery [id number]    Pure hypercholesterolemia    Sleep apnea    Tear of medial meniscus of left knee    surgery scheduled 04/19/19    Tear of medial meniscus of left knee  Global 07/18/2019    Unspecified internal derangement of knee    right knee    Vertical strabismus, left eye    Added automatically from request for surgery [id number]    Visual impairment       Past Surgical History:   Procedure Laterality Date    Anesth,shoulder replacement Right 2/6/2015    right reverse shoulder replacement    Cataract extraction w/  intraocular lens implant Right 01/2020    Cataract extraction w/  intraocular lens implant Left 02/2020    Colonoscopy  3/8/2005    normal    Colonoscopy  2/5/2009    normal    Colonoscopy  08/19/2014    Dr. Vazquez- Repeat in 3 years    Colonoscopy N/A 09/11/2018    diverticulosis     Colonoscopy N/A 12/8/2022    Procedure: COLONOSCOPY with FORCEP POLYPECTOMY;  Surgeon: Bora Vazquez MD;  Location:  ENDOSCOPY    Ct heart w/ calcium scoring  2/2/2005    calcium score of 0    D & c  1988    Exc skin malig 0.6-1cm trunk,arm,leg  2009    skin BCC neck    Exc skin malig 2.1-3cm trunk,arm,leg Left 7/2010    left neck basal cell cancer    Excis urethral caruncle  1982    Eye muscle surg proc unlisted Left 05/29/2020    Left inferior oblique myectomy    Knee scope,med+lat menis repair Right 1995    right knee arthrscopy    Knee scope,med+lat menis repair Left 04/19/2019    medial and lateral meniscal repairs, loose body    Peripheral vascular screening historical conv Bilateral 5/9/2016    mild left carotid narrowing, PAD screen        Medications Prior to Admission   Medication Sig Dispense Refill Last Dose    LYSINE OR Take by mouth.   Past Week    atorvastatin 10 MG Oral Tab Take 1 tablet (10 mg total) by mouth nightly. 90 tablet 1 8/4/2024 at 2030    nabumetone 500 MG Oral Tab Take 1 tablet (500 mg total) by mouth daily. 90 tablet 1 7/28/2024    losartan 50 MG Oral Tab TAKE 1 TABLET(50 MG) BY MOUTH DAILY 90 tablet 1 8/4/2024 at 0800    citalopram 20 MG Oral Tab TAKE 1 TABLET BY MOUTH EVERY DAY (Patient taking differently: Take 1 tablet (20 mg total) by mouth at bedtime. TAKE 1 TABLET BY MOUTH EVERY DAY) 90 tablet 1 8/4/2024 at 2030    hydrocortisone 2.5 % External Cream Apply 1 Application. topically 2 (two) times daily. Apply to eyebrows and cheeks 30 g 5 Past Month    betamethasone dipropionate 0.05 % External Lotion Apply 1 mL topically 2 (two) times daily. Apply to scalp 60 mL 5 8/4/2024 at 0800    fexofenadine 180 MG Oral Tab Take 1 tablet (180 mg total) by mouth daily.   8/5/2024 at 0430    LUTEIN OR Take by mouth daily.   8/4/2024 at 0800    Multiple Vitamins-Minerals (MACUVITE/LUTEIN) Oral Tab Take 1 tablet by mouth daily.       Naproxen Sodium 220 MG Oral Cap Take 220 mg by mouth daily.   7/28/2024    Vitamin B-12 (VITAMIN B12) 500 MCG Oral Tab Take 1 tablet (500 mcg total) by mouth once a week. 30 tablet 11 Past Week    Calcium Polycarbophil (FIBERCON OR) Take  by mouth.   8/4/2024 at 2000    Omeprazole Magnesium 20 MG Oral Tab EC Take 1 tablet (20 mg total) by mouth 2 (two) times daily. 60 tablet 11 8/4/2024 at 0800    CALCIUM 500 OR qd   8/4/2024 at 0800     Current Facility-Administered Medications Ordered in Epic   Medication Dose Route Frequency Provider Last Rate Last Admin    lactated ringers infusion   Intravenous Continuous Cathleen John MD 20 mL/hr at 08/05/24 0624 New Bag at 08/05/24 0624     No current [facility]-ordered outpatient medications on file.       Allergies   Allergen Reactions    Hydrocodone DIZZINESS     Grass OTHER (SEE COMMENTS)     Sinus symptoms    Codeine NAUSEA ONLY     Nausea         Family History   Problem Relation Age of Onset    Heart Surgery Father         valve replacement    Infectious Disease Father         endocarditis    Heart Attack Father     Kidney Disease Father     Arthritis Mother     Gastro-Intestinal Disorder Mother         diverticulosis, bleeding    Infectious Disease Mother         pneumonia    Stroke Mother         hemiparesis, aphasia    Clotting Disorder Mother         Pe    Colon Cancer Mother     Infectious Disease Daughter         viral meningitis    Obesity Sister     Hypertension Sister     Infectious Disease Sister         MRSA    Pulmonary Disease Sister         on O2    Heart Attack Sister     Stroke Sister         nursing home    Substance Abuse Brother     Breast Cancer Sister 53        53    Obesity Sister     Neurological Disorder Sister         MS    BRCA gene + Sister     Diabetes Other     Thyroid Disorder Daughter     Neurological Disorder Daughter         headaches    Diabetes Other     Colon Cancer Other     Obesity Sister      Social History     Socioeconomic History    Marital status:      Spouse name: Aldo    Number of children: 3    Years of education: 18   Occupational History    Occupation: Office Work/Adminstrative     Comment: works for    Tobacco Use    Smoking status: Never    Smokeless tobacco: Never   Vaping Use    Vaping status: Never Used   Substance and Sexual Activity    Alcohol use: Yes     Alcohol/week: 5.0 standard drinks of alcohol     Types: 5 Standard drinks or equivalent per week    Drug use: No    Sexual activity: Yes     Partners: Male   Other Topics Concern     Service No    Blood Transfusions No    Caffeine Concern Yes     Comment: 2 cups coffee/day    Occupational Exposure No    Hobby Hazards No    Sleep Concern No    Stress Concern No    Weight Concern No    Special Diet No    Back Care No    Exercise Yes     Comment:  5x/week water aerobics, , barre class, walking    Bike Helmet Yes    Seat Belt Yes    Self-Exams Yes       Available pre-op labs reviewed.  Lab Results   Component Value Date    WBC 6.2 06/03/2024    RBC 4.34 06/03/2024    HGB 14.5 06/03/2024    HCT 42.0 06/03/2024    MCV 96.8 06/03/2024    MCH 33.4 06/03/2024    MCHC 34.5 06/03/2024    RDW 11.8 06/03/2024    .0 06/03/2024     Lab Results   Component Value Date     06/03/2024    K 4.3 06/03/2024     06/03/2024    CO2 26.0 06/03/2024    BUN 19 06/03/2024    CREATSERUM 1.01 06/03/2024     (H) 06/03/2024    CA 8.9 06/03/2024          Vital Signs:  Body mass index is 32 kg/m².   height is 1.702 m (5' 7\") and weight is 92.7 kg (204 lb 4.8 oz). Her oral temperature is 98.2 °F (36.8 °C). Her blood pressure is 152/81 and her pulse is 71. Her respiration is 20 and oxygen saturation is 96%.   Vitals:    07/29/24 1056 08/05/24 0559 08/05/24 0627   BP:  (!) 155/96 152/81   Pulse:  71    Resp:  20    Temp:  98.2 °F (36.8 °C)    TempSrc:  Oral    SpO2:  96%    Weight: 90.3 kg (199 lb) 92.7 kg (204 lb 4.8 oz)    Height: 1.702 m (5' 7\") 1.702 m (5' 7\")         Anesthesia Evaluation      Airway   Mallampati: II  TM distance: >3 FB  Neck ROM: full  Dental - Dentition appears grossly intact     Pulmonary - normal exam   (+) sleep apnea  (-) asthma  Cardiovascular - normal exam  (+) hypertension, valvular problems/murmurs MVP  (-) past MI, dysrhythmias    Neuro/Psych    (-) CVA    GI/Hepatic/Renal    (+) GERD    Endo/Other    (+) arthritis  (-) diabetes mellitus, hypothyroidism  Abdominal                  Anesthesia Plan:   ASA:  3  Plan:   General  Airway:  ETT  Post-op Pain Management: IV analgesics and Oral pain medication  Informed Consent Plan and Risks Discussed With:  Patient  Discussed plan with:  CRNA      I have informed Cynthia Snell and/or legal guardian or family member of the nature of the anesthetic plan, benefits, risks  including possible dental damage if relevant, major complications, and any alternative forms of anesthetic management.   All of the patient's questions were answered to the best of my ability. The patient desires the anesthetic management as planned.  TENISHA BECERRA MD  8/5/2024 7:08 AM  Present on Admission:   Bilateral ovarian cysts

## 2024-08-05 NOTE — OPERATIVE REPORT
[facility] OPERATING ROOM  Operative Note     Cynthia Snell Location: OR   [facility] [id number] MRN [id number]   Admission Date 8/5/2024 Operation Date 8/5/2024   Attending Physician Cathleen John MD Operating Physician Cathleen John MD      Preoperative Diagnosis: Bilateral ovarian cysts [N83.201, N83.202]     Postoperative Diagnosis: Bilateral ovarian cysts [N83.201, N83.202]     Procedure Performed:   Laparoscopic bilateral salpingo oophorectomy     Primary Surgeon: Cathleen John MD      Assistant: Scar Fleming      Surgical Findings: bilaterally enlarged ovarian cysts with a left uterine subserosal cystic structure     Anesthesia: General     Complications: none      Implants: * No implants in log *     Specimen: bilateral ovaries, tubes and uterine subserosal cyst      Drains: none      Condition: stable     Estimated Blood Loss: No data recorded     Summary of Case: Patient was taken to the operative suite where she was administered general endotracheal anesthesia.  Patient was then placed in supine position. Abdominal and vaginal prep were performed.  Bladder catheterization was performed.  A time-out was then performed.    Using a scalpel, a 5 mm incision was made infraumbilically.  The abdomen and peritoneum were then entered under direct visualization using a 5 mm Optiview trocar.  After confirming intraperitoneal placement, CO2 gas was used to insufflate the abdomen.  Camera was inserted and patient was placed in Trendelenburg.  Pelvis was found to have bilaterally enlarged ovarian cysts with a left uterine subserosal cystic structure.    A 5 mm incisions were made on the lateral abdominal side wall and trocars were introduced under direct visualization.  The left IP was first elevated using an atraumatic grasper and the tube and ovary  was  then removed using the LigaSure [place] sealer divider device.  Good hemostasis was noted from the left adnexal site.    The right IP was  then identified and elevated with an atraumatic grasper.  The right adnexa  was then removed using the same LigaSure sealer and divider.  Left subserosal cyst noted on uterus and removed with the LigaSure. Good hemostasis was noted.  Bilateral ovaries and tubes  were removed in their entirety and sent for pathologic evaluation.    Pressure was released from the abdomen.  The adnexal sites were again inspected and hemostatic.  Gas was then released from the abdomen and trocars were removed under direct visualization.      The incisions were then closed using dermabond. The LLQ trocar site was enlarged to remove the specimens and fascia was closed with 0-Vicryl.     Patient was taken out of dorsal lithotomy position, awaken and taken to ANA in stable condition.  Post procedure time out was done. All OR counts were correct.    Patient tolerated the procedure well.  She was transferred to recovery room in stable condition.   All counts were correct.   Blood loss was estimated at <10  mL.    Scar Fleming was utilized as a surgical assist for the entire procedure due to the need for tissue retraction, dissection of vital structures, prevention and management of blood loss, and reduction in overall operative and anesthesia time. They were also involved in the critical decision making as it relates to the complexity of this case.           Cathleen John MD  8/5/2024  8:26 AM

## 2024-08-05 NOTE — INTERVAL H&P NOTE
Pre-op Diagnosis: Bilateral ovarian cysts [N83.201, N83.202]    The above referenced H&P was reviewed by Cathleen John MD on 8/5/2024, the patient was examined and no significant changes have occurred in the patient's condition since the H&P was performed.  I discussed with the patient and/or legal representative the potential benefits, risks and side effects of this procedure; the likelihood of the patient achieving goals; and potential problems that might occur during recuperation.  I discussed reasonable alternatives to the procedure, including risks, benefits and side effects related to the alternatives and risks related to not receiving this procedure.  We will proceed with procedure as planned.

## 2024-08-06 ENCOUNTER — TELEPHONE (OUTPATIENT)
Dept: OBGYN CLINIC | Facility: CLINIC | Age: 74
End: 2024-08-06

## 2024-08-14 ENCOUNTER — HOSPITAL ENCOUNTER (OUTPATIENT)
Dept: MAMMOGRAPHY | Facility: HOSPITAL | Age: 74
Discharge: HOME | End: 2024-08-14
Attending: FAMILY MEDICINE
Payer: MEDICARE

## 2024-08-14 DIAGNOSIS — Z12.31 SCREENING MAMMOGRAM FOR BREAST CANCER: ICD-10-CM

## 2024-08-14 PROCEDURE — 77063 BREAST TOMOSYNTHESIS BI: CPT | Performed by: FAMILY MEDICINE

## 2024-08-14 PROCEDURE — 77067 SCR MAMMO BI INCL CAD: CPT | Performed by: FAMILY MEDICINE

## 2024-08-23 ENCOUNTER — OFFICE VISIT (OUTPATIENT)
Dept: OBGYN CLINIC | Facility: CLINIC | Age: 74
End: 2024-08-23
Payer: MEDICARE

## 2024-08-23 VITALS
HEART RATE: 98 BPM | WEIGHT: 205 LBS | BODY MASS INDEX: 32 KG/M2 | DIASTOLIC BLOOD PRESSURE: 86 MMHG | SYSTOLIC BLOOD PRESSURE: 166 MMHG

## 2024-08-23 DIAGNOSIS — Z09 POSTOP CHECK: Primary | ICD-10-CM

## 2024-11-29 ENCOUNTER — TELEPHONE (OUTPATIENT)
Dept: FAMILY MEDICINE CLINIC | Facility: CLINIC | Age: 74
End: 2024-11-29

## 2025-01-20 ENCOUNTER — OFFICE VISIT (OUTPATIENT)
Dept: FAMILY MEDICINE CLINIC | Facility: CLINIC | Age: 75
End: 2025-01-20
Payer: MEDICARE

## 2025-01-20 ENCOUNTER — HOSPITAL ENCOUNTER (OUTPATIENT)
Dept: GENERAL RADIOLOGY | Age: 75
Discharge: HOME | End: 2025-01-20
Attending: FAMILY MEDICINE
Payer: MEDICARE

## 2025-01-20 ENCOUNTER — LAB ENCOUNTER (OUTPATIENT)
Dept: LAB | Age: 75
End: 2025-01-20
Attending: FAMILY MEDICINE
Payer: MEDICARE

## 2025-01-20 VITALS
HEART RATE: 86 BPM | WEIGHT: 205 LBS | BODY MASS INDEX: 32.18 KG/M2 | DIASTOLIC BLOOD PRESSURE: 88 MMHG | TEMPERATURE: 98 F | SYSTOLIC BLOOD PRESSURE: 138 MMHG | RESPIRATION RATE: 16 BRPM | HEIGHT: 67 IN

## 2025-01-20 DIAGNOSIS — E53.8 VITAMIN B 12 DEFICIENCY: ICD-10-CM

## 2025-01-20 DIAGNOSIS — R93.1 ELEVATED CORONARY ARTERY CALCIUM SCORE: ICD-10-CM

## 2025-01-20 DIAGNOSIS — G89.29 BILATERAL CHRONIC KNEE PAIN: ICD-10-CM

## 2025-01-20 DIAGNOSIS — M25.561 BILATERAL CHRONIC KNEE PAIN: ICD-10-CM

## 2025-01-20 DIAGNOSIS — Z78.0 POSTMENOPAUSAL: ICD-10-CM

## 2025-01-20 DIAGNOSIS — Z13.820 OSTEOPOROSIS SCREENING: ICD-10-CM

## 2025-01-20 DIAGNOSIS — F34.1 CHRONIC DEPRESSIVE PERSONALITY DISORDER: ICD-10-CM

## 2025-01-20 DIAGNOSIS — M25.562 BILATERAL CHRONIC KNEE PAIN: ICD-10-CM

## 2025-01-20 DIAGNOSIS — K21.9 GASTROESOPHAGEAL REFLUX DISEASE, UNSPECIFIED WHETHER ESOPHAGITIS PRESENT: ICD-10-CM

## 2025-01-20 DIAGNOSIS — M15.0 PRIMARY OSTEOARTHRITIS INVOLVING MULTIPLE JOINTS: ICD-10-CM

## 2025-01-20 DIAGNOSIS — E78.2 MIXED HYPERLIPIDEMIA: ICD-10-CM

## 2025-01-20 DIAGNOSIS — I10 ESSENTIAL HYPERTENSION, BENIGN: ICD-10-CM

## 2025-01-20 DIAGNOSIS — Z00.00 ENCOUNTER FOR MEDICARE ANNUAL WELLNESS EXAM: Primary | ICD-10-CM

## 2025-01-20 DIAGNOSIS — N18.31 CHRONIC KIDNEY DISEASE, STAGE 3A (HCC): ICD-10-CM

## 2025-01-20 LAB
ALBUMIN SERPL-MCNC: 4.4 G/DL (ref 3.2–4.8)
ALBUMIN/GLOB SERPL: 1.6 {RATIO} (ref 1–2)
ALP LIVER SERPL-CCNC: 78 U/L
ALT SERPL-CCNC: 45 U/L
ANION GAP SERPL CALC-SCNC: 8 MMOL/L (ref 0–18)
AST SERPL-CCNC: 38 U/L (ref ?–34)
BASOPHILS # BLD AUTO: 0.07 X10(3) UL (ref 0–0.2)
BASOPHILS NFR BLD AUTO: 0.9 %
BILIRUB SERPL-MCNC: 0.5 MG/DL (ref 0.2–1.1)
BUN BLD-MCNC: 18 MG/DL (ref 9–23)
CALCIUM BLD-MCNC: 10.1 MG/DL (ref 8.7–10.6)
CHLORIDE SERPL-SCNC: 106 MMOL/L (ref 98–112)
CHOLEST SERPL-MCNC: 171 MG/DL (ref ?–200)
CO2 SERPL-SCNC: 28 MMOL/L (ref 21–32)
CREAT BLD-MCNC: 1.04 MG/DL
EGFRCR SERPLBLD CKD-EPI 2021: 57 ML/MIN/1.73M2 (ref 60–?)
EOSINOPHIL # BLD AUTO: 0.21 X10(3) UL (ref 0–0.7)
EOSINOPHIL NFR BLD AUTO: 2.7 %
ERYTHROCYTE [DISTWIDTH] IN BLOOD BY AUTOMATED COUNT: 11.8 %
FASTING PATIENT LIPID ANSWER: YES
FASTING STATUS PATIENT QL REPORTED: YES
GLOBULIN PLAS-MCNC: 2.8 G/DL (ref 2–3.5)
GLUCOSE BLD-MCNC: 94 MG/DL (ref 70–99)
HCT VFR BLD AUTO: 42.6 %
HDLC SERPL-MCNC: 71 MG/DL (ref 40–59)
HGB BLD-MCNC: 14.3 G/DL
IMM GRANULOCYTES # BLD AUTO: 0.02 X10(3) UL (ref 0–1)
IMM GRANULOCYTES NFR BLD: 0.3 %
LDLC SERPL CALC-MCNC: 81 MG/DL (ref ?–100)
LYMPHOCYTES # BLD AUTO: 1.72 X10(3) UL (ref 1–4)
LYMPHOCYTES NFR BLD AUTO: 22 %
MCH RBC QN AUTO: 33.1 PG (ref 26–34)
MCHC RBC AUTO-ENTMCNC: 33.6 G/DL (ref 31–37)
MCV RBC AUTO: 98.6 FL
MONOCYTES # BLD AUTO: 0.82 X10(3) UL (ref 0.1–1)
MONOCYTES NFR BLD AUTO: 10.5 %
NEUTROPHILS # BLD AUTO: 4.99 X10 (3) UL (ref 1.5–7.7)
NEUTROPHILS # BLD AUTO: 4.99 X10(3) UL (ref 1.5–7.7)
NEUTROPHILS NFR BLD AUTO: 63.6 %
NONHDLC SERPL-MCNC: 100 MG/DL (ref ?–130)
OSMOLALITY SERPL CALC.SUM OF ELEC: 296 MOSM/KG (ref 275–295)
PLATELET # BLD AUTO: 251 10(3)UL (ref 150–450)
POTASSIUM SERPL-SCNC: 4.6 MMOL/L (ref 3.5–5.1)
PROT SERPL-MCNC: 7.2 G/DL (ref 5.7–8.2)
RBC # BLD AUTO: 4.32 X10(6)UL
SODIUM SERPL-SCNC: 142 MMOL/L (ref 136–145)
TRIGL SERPL-MCNC: 108 MG/DL (ref 30–149)
VIT B12 SERPL-MCNC: 750 PG/ML (ref 211–911)
VLDLC SERPL CALC-MCNC: 17 MG/DL (ref 0–30)
WBC # BLD AUTO: 7.8 X10(3) UL (ref 4–11)

## 2025-01-20 PROCEDURE — 85025 COMPLETE CBC W/AUTO DIFF WBC: CPT

## 2025-01-20 PROCEDURE — 73564 X-RAY EXAM KNEE 4 OR MORE: CPT | Performed by: FAMILY MEDICINE

## 2025-01-20 PROCEDURE — 80053 COMPREHEN METABOLIC PANEL: CPT

## 2025-01-20 PROCEDURE — 36415 COLL VENOUS BLD VENIPUNCTURE: CPT

## 2025-01-20 PROCEDURE — 82607 VITAMIN B-12: CPT

## 2025-01-20 PROCEDURE — 80061 LIPID PANEL: CPT

## 2025-01-20 RX ORDER — ATORVASTATIN CALCIUM 10 MG/1
10 TABLET, FILM COATED ORAL NIGHTLY
Qty: 90 TABLET | Refills: 1 | Status: SHIPPED | OUTPATIENT
Start: 2025-01-20

## 2025-01-20 RX ORDER — CITALOPRAM HYDROBROMIDE 20 MG/1
TABLET ORAL
Qty: 90 TABLET | Refills: 1 | Status: SHIPPED | OUTPATIENT
Start: 2025-01-20

## 2025-01-20 NOTE — PROGRESS NOTES
Subjective:   Cynthia Snell is a 74 year old female who presents for a Medicare Subsequent Annual Wellness visit (Pt already had Initial Annual Wellness).     Patient c/o bilateral knee pain today.  Sx's started about one month ago. Mechanism of injury: unknown - denies any injuries. Pt denies any significant swelling of the knees. Tenderness is centered at patella. Pt has pain with walking. Pain is made worse with activity. Pt denies prior history of knee injury.    Her mammogram is up-to-date.  Colonoscopy is up to date.  She has a history of hypertension.  Her blood pressure has been running 130s/70s at home.  She had an ultrafast heart scan done with an elevated calcium score just over 100.  She was seen by cardiology who recommended an echocardiogram and stress test both of which were stable.  She is not having any chest pain or shortness of breath.  She also has a history of osteoarthritis that was controlled by taking nabumetone.      History/Other:   Fall Risk Assessment:   She has been screened for Falls and is low risk.      Cognitive Assessment:   She had a completely normal cognitive assessment - see flowsheet entries     Functional Ability/Status:   Cynthia Snell has some abnormal functions as listed below:  She has Dressing and/or Bathing issues based on screening of functional status.  Difficulty dressing or bathing?: (Patient-Rptd) Yes  Bathing or Showering: (Patient-Rptd) Able without help  Dressing: (Patient-Rptd) Able without help          Depression Screening (PHQ-2/PHQ-9): PHQ-2 SCORE: 0  , done 1/18/2025          Advanced Directives:   She does have a Living Will but we do NOT have it on file in Epic.    She does have a POA but we do NOT have it on file in Epic.    Discussed Advance Care Planning with patient (and family/surrogate if present). Standard forms made available to patient in After Visit Summary.      Patient Active Problem List   Diagnosis    Essential  hypertension, benign    Chronic depressive personality disorder    GERD (gastroesophageal reflux disease)    Vitamin B 12 deficiency    Colon polyps    Family history of colon cancer, maternal aunt    Chronic kidney disease, stage 3a (HCC)    Primary osteoarthritis involving multiple joints    Mixed hyperlipidemia    Adult form of gluten-sensitive enteropathy    Elevated coronary artery calcium score    Bilateral ovarian cysts    Uterine cyst     Allergies:  She is allergic to hydrocodone, grass, and codeine.    Current Medications:  Outpatient Medications Marked as Taking for the 1/20/25 encounter (Office Visit) with Rafael Luz, DO   Medication Sig    atorvastatin 10 MG Oral Tab Take 1 tablet (10 mg total) by mouth nightly.    citalopram 20 MG Oral Tab TAKE 1 TABLET BY MOUTH EVERY DAY    LYSINE OR Take by mouth.    nabumetone 500 MG Oral Tab Take 1 tablet (500 mg total) by mouth daily.    losartan 50 MG Oral Tab TAKE 1 TABLET(50 MG) BY MOUTH DAILY    hydrocortisone 2.5 % External Cream Apply 1 Application. topically 2 (two) times daily. Apply to eyebrows and cheeks    betamethasone dipropionate 0.05 % External Lotion Apply 1 mL topically 2 (two) times daily. Apply to scalp    fexofenadine 180 MG Oral Tab Take 1 tablet (180 mg total) by mouth daily.    LUTEIN OR Take by mouth daily.    Multiple Vitamins-Minerals (MACUVITE/LUTEIN) Oral Tab Take 1 tablet by mouth daily.    Naproxen Sodium 220 MG Oral Cap Take 220 mg by mouth daily.    Vitamin B-12 (VITAMIN B12) 500 MCG Oral Tab Take 1 tablet (500 mcg total) by mouth once a week.    Calcium Polycarbophil (FIBERCON OR) Take  by mouth.    Omeprazole Magnesium 20 MG Oral Tab EC Take 1 tablet (20 mg total) by mouth 2 (two) times daily.    CALCIUM 500 OR qd       Medical History:  She  has a past medical history of Abnormal screening CT of heart (05/09/2016), Acquired ptosis of both eyelids, Allergic rhinitis due to pollen (aka 4770), Anxiety state, unspecified,  Bilateral senile cataracts (2019), Cancer (HCC), Cataract, Chondral loose body of left knee joint (04/19/2019), Degenerative tear of lateral meniscus, left (04/19/2019), Degenerative tear of posterior horn of medial meniscus, left (04/19/2019), Dysthymic disorder, Esophageal reflux, Essential hypertension, benign, Family history of colonic polyps, mother (07/27/2022), High blood pressure, High cholesterol, History of basal cell cancer (2009), IBS (irritable bowel syndrome), Mitral valve prolapse, Osteoarthrosis, unspecified whether generalized or localized, unspecified site, Personal history of colonic polyps, Plantar fasciitis of right foot, Pneumonia due to organism, Primary osteoarthritis of left knee (04/19/2019), Ptosis, myogenic, 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  Surgical History:  She  has a past surgical history that includes ct heart w/ calcium scoring (2/2/2005); colonoscopy (3/8/2005); colonoscopy (2/5/2009); d & c (1988); exc skin malig 2.1-3cm trunk,arm,leg (Left, 7/2010); colonoscopy (08/19/2014); knee scope,med+lat menis repair (Right, 1995); anesth,shoulder replacement (Right, 2/6/2015); exc skin malig 0.6-1cm trunk,arm,leg (2009); excis urethral caruncle (1982); peripheral vascular screening historical conv (Bilateral, 5/9/2016); colonoscopy (N/A, 09/11/2018); knee scope,med+lat menis repair (Left, 04/19/2019); Cataract extraction w/  intraocular lens implant (Right, 01/2020); Cataract extraction w/  intraocular lens implant (Left, 02/2020); eye muscle surg proc unlisted (Left, 05/29/2020); and colonoscopy (N/A, 12/8/2022).   Family History:  Her family history includes Arthritis in her mother; BRCA gene + in her sister;  Breast Cancer (age of onset: 53) in her sister; Clotting Disorder in her mother; Colon Cancer in her mother and another family member; Diabetes in some other family members; Gastro-Intestinal Disorder in her mother; Heart Attack in her father and sister; Heart Surgery in her father; Hypertension in her sister; Infectious Disease in her daughter, father, mother, and sister; Kidney Disease in her father; Neurological Disorder in her daughter and sister; Obesity in her sister, sister, and sister; Pulmonary Disease in her sister; Stroke in her mother and sister; Substance Abuse in her brother; Thyroid Disorder in her daughter.  Social History:  She  reports that she has never smoked. She has never used smokeless tobacco. She reports current alcohol use of about 5.0 standard drinks of alcohol per week. She reports that she does not use drugs.    Tobacco:  She has never smoked tobacco.    CAGE Alcohol Screen:   CAGE screening score of 0 on 1/18/2025, showing low risk of alcohol abuse.      Patient Care Team:  Rafael Luz DO as PCP - General (Family Medicine)  Braulio Cruz MD as Consulting Physician (SURGERY, ORTHOPEDIC)  René Cerrato MD as Consulting Physician (SURGERY, ORTHOPEDIC)  Bora Vazquez MD as Consulting Physician (SURGERY, GENERAL)  Vanessa Juarez as Consulting Physician (OPHTHALMOLOGY)  Donovan Acharya as Consulting Physician (OPHTHALMOLOGY)  Josh Marrero MD as Consulting Physician (DERMATOLOGY)  Stanley Crane as Consulting Physician (OPHTHALMOLOGY)  Trevon Montoya as Consulting Physician (OPHTHALMOLOGY)  Jessica Gilbert PT as Physical Therapist (Physical Therapy)  Homer Garcia MD (CARDIOLOGY)  Cathleen John MD (OBSTETRICS & GYNECOLOGY)    Review of Systems  GENERAL: feels well otherwise  SKIN: denies any unusual skin lesions  EYES: denies blurred vision or double vision  HEENT: denies nasal congestion, sinus pain or ST  LUNGS: denies shortness of breath with  exertion  CARDIOVASCULAR: denies chest pain on exertion  GI: denies abdominal pain, + intermittent heartburn  : denies dysuria, vaginal discharge or itching, no complaint of urinary incontinence   MUSCULOSKELETAL: denies back pain, + joint pains, + bilateral knee pain  NEURO: denies headaches  PSYCHE: + h/o depression  HEMATOLOGIC: denies hx of anemia  ENDOCRINE: denies thyroid history  ALL/ASTHMA: denies hx of allergy or asthma    Objective:   Physical Exam  General Appearance:  Alert, cooperative, no distress, appears stated age   Head:  Normocephalic, without obvious abnormality, atraumatic   Eyes:  PERRL, conjunctiva/corneas clear, EOM's intact both eyes   Ears:  Normal TM's and external ear canals, both ears   Nose: Nares normal, septum midline,mucosa normal, no drainage or sinus tenderness   Throat: Lips, mucosa, and tongue normal; teeth and gums normal   Neck: Supple, symmetrical, trachea midline, no adenopathy;  thyroid: not enlarged, symmetric, no tenderness/mass/nodules; no JVD   Back:   Symmetric, no curvature, ROM normal   Lungs:   Clear to auscultation bilaterally, respirations unlabored   Heart:  Regular rate and rhythm, S1 and S2 normal, no murmur, rub, or gallop   Abdomen:   Soft, non-tender, bowel sounds active all four quadrants,  no masses, no organomegaly   Pelvic: Deferred   Extremities: Extremities normal, atraumatic, no cyanosis or edema   Pulses: 2+ and symmetric   Skin: Skin color, texture, turgor normal, no rashes or lesions   Lymph nodes: No cervical or supraclavicular LAD   Neurologic: CN 2-12 grossly intact       /88   Pulse 86   Temp 97.7 °F (36.5 °C) (Temporal)   Resp 16   Ht 5' 7\" (1.702 m)   Wt 205 lb (93 kg)   BMI 32.11 kg/m²  Estimated body mass index is 32.11 kg/m² as calculated from the following:    Height as of this encounter: 5' 7\" (1.702 m).    Weight as of this encounter: 205 lb (93 kg).    Medicare Hearing Assessment:   Hearing Screening    Time taken:  1/20/2025 11:31 AM  Screening Method: Finger Rub  Finger Rub Result: Pass                   Assessment & Plan:   Cynthia Snell is a 74 year old female who presents for a Medicare Assessment.     1. Encounter for Medicare annual wellness exam (Primary)  -  prevnar 20 up to date  -  Mammogram up to date  -  Colonoscopy up to date  -  dexa scan ordered    2. Essential hypertension, benign  -  Controlled  -  Continue losartan  -  Check renal function    3. Mixed hyperlipidemia  -  Recheck lipid panel  -  Adjust lipitor to keep LDL < 100    4. Elevated coronary artery calcium score  -  See above  -  managed by cardiology    5. Chronic depressive personality disorder  -  Stable  -  Continue citalopram    6. Primary osteoarthritis involving multiple joints  -  Stable  -  Continue nabumetone but consider celebrex instead pending lab results    7. Chronic kidney disease, stage 3a (HCC)  -  Recheck renal function  -  Limit nsaid use    8. Vitamin B 12 deficiency  -  Check CBC and B12 levels    9. Gastroesophageal reflux disease, unspecified whether esophagitis present  -  Stable  -  Recommend dietary modification to avoid spicy and acidic foods    10. Osteoporosis screening  -  dexa scan ordered    11. Postmenopausal  -  dexa scan ordered    12. Chronic bilateral knee pain  -  likely 2/2 OA  -  check xrays of both knees  -  consider celebrex or ortho referral pending xray results      The patient indicates understanding of these issues and agrees to the plan.  Continue with current treatment plan.  Imaging studies ordered.  Lab work ordered.  Reinforced healthy diet, lifestyle, and exercise.      No follow-ups on file.     ANTONIO MOSES DO, 12/9/2022     Supplementary Documentation:   General Health:  In the past six months, have you lost more than 10 pounds without trying?: (Patient-Rptd) 2 - No  Has your appetite been poor?: (Patient-Rptd) No  Type of Diet: (Patient-Rptd) Balanced  How does the patient  maintain a good energy level?: (Patient-Rptd) Appropriate Exercise  How would you describe your daily physical activity?: (Patient-Rptd) Moderate  How would you describe your current health state?: (Patient-Rptd) Good  How do you maintain positive mental well-being?: (Patient-Rptd) Social Interaction;Puzzles;Games;Visiting Friends;Visiting Family  On a scale of 0 to 10, with 0 being no pain and 10 being severe pain, what is your pain level?: (Patient-Rptd) 5 - (Moderate)  In the past six months, have you experienced urine leakage?: (Patient-Rptd) 0-No  At any time do you feel concerned for the safety/well-being of yourself and/or your children, in your home or elsewhere?: (Patient-Rptd) No  Have you had any immunizations at another office such as Influenza, Hepatitis B, Tetanus, or Pneumococcal?: (Patient-Rptd) Yes       Cynthia Snell's SCREENING SCHEDULE   Tests on this list are recommended by your physician but may not be covered, or covered at this frequency, by your insurer.   Please check with your insurance carrier before scheduling to verify coverage.   PREVENTATIVE SERVICES FREQUENCY &  COVERAGE DETAILS LAST COMPLETION DATE   Diabetes Screening    Fasting Blood Sugar /  Glucose    One screening every 12 months if never tested or if previously tested but not diagnosed with pre-diabetes   One screening every 6 months if diagnosed with pre-diabetes Lab Results   Component Value Date    GLUCOSE 90 04/25/2016     (H) 06/03/2024        Cardiovascular Disease Screening    Lipid Panel  Cholesterol  Lipoprotein (HDL)  Triglycerides Covered every 5 years for all Medicare beneficiaries without apparent signs or symptoms of cardiovascular disease Lab Results   Component Value Date    CHOLEST 172 07/18/2024    HDL 67 (H) 07/18/2024    LDL 92 07/18/2024    TRIG 71 07/18/2024         Electrocardiogram (EKG)   Covered if needed at Welcome to Medicare, and non-screening if indicated for medical reasons  04/16/2019      Ultrasound Screening for Abdominal Aortic Aneurysm (AAA) Covered once in a lifetime for one of the following risk factors    Men who are 65-75 years old and have ever smoked    Anyone with a family history -     Colorectal Cancer Screening  Covered for ages 50-85; only need ONE of the following:    Colonoscopy   Covered every 10 years    Covered every 2 years if patient is at high risk or previous colonoscopy was abnormal 12/08/2022    Health Maintenance   Topic Date Due    Colorectal Cancer Screening  12/08/2025       Flexible Sigmoidoscopy   Covered every 4 years -    Fecal Occult Blood Test Covered annually -   Bone Density Screening    Bone density screening    Covered every 2 years after age 65 if diagnosed with risk of osteoporosis or estrogen deficiency.    Covered yearly for long-term glucocorticoid medication use (Steroids) Last Dexa Scan:    XR DEXA BONE DENSITOMETRY (CPT=77080) 12/12/2022      No recommendations at this time   Pap and Pelvic    Pap   Covered every 2 years for women at normal risk; Annually if at high risk -  No recommendations at this time    Chlamydia Annually if high risk -  No recommendations at this time   Screening Mammogram    Mammogram     Recommend annually for all female patients aged 40 and older    One baseline mammogram covered for patients aged 35-39 08/14/2024    Health Maintenance   Topic Date Due    Mammogram  08/14/2025       Immunizations    Influenza Covered once per flu season  Please get every year -  Influenza Vaccine(1) due on 10/01/2024    Pneumococcal Each vaccine ([id number] & [id number]) covered once after 65 Prevnar 13: 05/02/2016    [id number]: 10/11/2017     No recommendations at this time    Hepatitis B One screening covered for patients with certain risk factors   -  No recommendations at this time    Tetanus Toxoid Not covered by Medicare Part B unless medically necessary (cut with metal); may be covered with your pharmacy prescription  benefits -    Tetanus, Diptheria and Pertusis TD and TDaP Not covered by Medicare Part B -  No recommendations at this time    Zoster Not covered by Medicare Part B; may be covered with your pharmacy  prescription benefits 09/22/2012  No recommendations at this time     Annual Monitoring of Persistent Medications (ACE/ARB, digoxin diuretics, anticonvulsants)    Potassium Annually Lab Results   Component Value Date    K 4.3 06/03/2024         Creatinine   Annually Lab Results   Component Value Date    CREATSERUM 1.01 06/03/2024         BUN Annually Lab Results   Component Value Date    BUN 19 06/03/2024       Drug Serum Conc Annually No results found for: \"DIGOXIN\", \"DIG\", \"VALP\"

## 2025-01-21 ENCOUNTER — TELEPHONE (OUTPATIENT)
Dept: ORTHOPEDICS CLINIC | Facility: CLINIC | Age: 75
End: 2025-01-21

## 2025-01-21 NOTE — TELEPHONE ENCOUNTER
Patient is scheduled for BROOKE knee pain. Xrays in Epic from 1/20. Please advise if additional imaging is needed.  Future Appointments   Date Time Provider Department Center   1/23/2025  9:00 AM Magdalena Ramirez MD [facility] [id number]   2/10/2025  8:15 AM Vipul Gonzales MD [facility]   7/10/2025  3:00 PM Cathleen John MD [id number]   7/21/2025  8:00 AM Rafael Luz DO [facility] [id number]

## 2025-01-23 ENCOUNTER — OFFICE VISIT (OUTPATIENT)
Dept: ORTHOPEDICS CLINIC | Facility: CLINIC | Age: 75
End: 2025-01-23
Payer: MEDICARE

## 2025-01-23 VITALS — BODY MASS INDEX: 32.18 KG/M2 | HEIGHT: 67 IN | WEIGHT: 205 LBS

## 2025-01-23 DIAGNOSIS — M17.0 PRIMARY OSTEOARTHRITIS OF BOTH KNEES: Primary | ICD-10-CM

## 2025-01-23 PROCEDURE — 20610 DRAIN/INJ JOINT/BURSA W/O US: CPT | Performed by: ORTHOPAEDIC SURGERY

## 2025-01-23 PROCEDURE — 99204 OFFICE O/P NEW MOD 45 MIN: CPT | Performed by: ORTHOPAEDIC SURGERY

## 2025-01-23 RX ORDER — TRIAMCINOLONE ACETONIDE 40 MG/ML
40 INJECTION, SUSPENSION INTRA-ARTICULAR; INTRAMUSCULAR ONCE
Status: COMPLETED | OUTPATIENT
Start: 2025-01-23 | End: 2025-01-23

## 2025-01-23 RX ADMIN — TRIAMCINOLONE ACETONIDE 40 MG: 40 INJECTION, SUSPENSION INTRA-ARTICULAR; INTRAMUSCULAR at 09:44:00

## 2025-01-23 NOTE — PROGRESS NOTES
[facility] New Consult      Chief Complaint   Patient presents with    Knee Pain     BILATERAL KNEE  -Left is more painful  -Hx of bilateral knee arthroscopies       HPI: The patient is a 74 year old female referred for orthopaedic consultation by Dr. Rafael soto with complaints of chronic bilateral knee pain.  Most of her pain is localized at the anterior aspects exacerbated by prolonged standing, walking and stair use.  Symptoms have worsened over the holidays partially alleviated by anti-inflammatories and Tylenol at night.  She was also prescribed nabumetone which has helped her sleep.  There is a remote history of arthroscopy to both knees performed greater than 10 years ago.  She denies catching, locking, instability although there is intermittent subjective instability on the left without any actual history of giving way.    Past Medical History:    Abnormal screening CT of heart    calcium score 28    Acquired ptosis of both eyelids    Allergic rhinitis due to pollen (aka 4770)    Anxiety state, unspecified    Bilateral senile cataracts    Cancer (HCC)    basal cell    Cataract    Chondral loose body of left knee joint    Degenerative tear of lateral meniscus, left    Degenerative tear of posterior horn of medial meniscus, left    Dysthymic disorder    Esophageal reflux    Essential hypertension, benign    Family history of colonic polyps, mother    High blood pressure    High cholesterol    History of basal cell cancer    skin, neck    IBS (irritable bowel syndrome)    Mitral valve prolapse    Osteoarthrosis, unspecified whether generalized or localized, unspecified site    hands knees    Personal history of colonic polyps    Plantar fasciitis of right foot    Pneumonia due to organism    Primary osteoarthritis of left knee    Ptosis, myogenic, left    Added automatically from request for surgery [id number]    Ptosis, myogenic, right    Added automatically from request for surgery  [id number]    Pure hypercholesterolemia    Sleep apnea    Tear of medial meniscus of left knee    surgery scheduled 04/19/19    Tear of medial meniscus of left knee  Global 07/18/2019    Unspecified internal derangement of knee    right knee    Vertical strabismus, left eye    Added automatically from request for surgery [id number]    Visual impairment     Past Surgical History:   Procedure Laterality Date    Anesth,shoulder replacement Right 2/6/2015    right reverse shoulder replacement    Cataract extraction w/  intraocular lens implant Right 01/2020    Cataract extraction w/  intraocular lens implant Left 02/2020    Colonoscopy  3/8/2005    normal    Colonoscopy  2/5/2009    normal    Colonoscopy  08/19/2014    Dr. Vazquez- Repeat in 3 years    Colonoscopy N/A 09/11/2018    diverticulosis     Colonoscopy N/A 12/8/2022    Procedure: COLONOSCOPY with FORCEP POLYPECTOMY;  Surgeon: Bora Vazquez MD;  Location:  ENDOSCOPY    Ct heart w/ calcium scoring  2/2/2005    calcium score of 0    D & c  1988    Exc skin malig 0.6-1cm trunk,arm,leg  2009    skin BCC neck    Exc skin malig 2.1-3cm trunk,arm,leg Left 7/2010    left neck basal cell cancer    Excis urethral caruncle  1982    Eye muscle surg proc unlisted Left 05/29/2020    Left inferior oblique myectomy    Knee scope,med+lat menis repair Right 1995    right knee arthrscopy    Knee scope,med+lat menis repair Left 04/19/2019    medial and lateral meniscal repairs, loose body    Peripheral vascular screening historical conv Bilateral 5/9/2016    mild left carotid narrowing, PAD screen     Current Outpatient Medications   Medication Sig Dispense Refill    atorvastatin 10 MG Oral Tab Take 1 tablet (10 mg total) by mouth nightly. 90 tablet 1    citalopram 20 MG Oral Tab TAKE 1 TABLET BY MOUTH EVERY DAY 90 tablet 1    LYSINE OR Take by mouth.      nabumetone 500 MG Oral Tab Take 1 tablet (500 mg total) by mouth daily. 90 tablet 1    losartan 50 MG Oral Tab TAKE 1 TABLET(50  MG) BY MOUTH DAILY 90 tablet 1    hydrocortisone 2.5 % External Cream Apply 1 Application. topically 2 (two) times daily. Apply to eyebrows and cheeks 30 g 5    betamethasone dipropionate 0.05 % External Lotion Apply 1 mL topically 2 (two) times daily. Apply to scalp 60 mL 5    fexofenadine 180 MG Oral Tab Take 1 tablet (180 mg total) by mouth daily.      LUTEIN OR Take by mouth daily.      Multiple Vitamins-Minerals (MACUVITE/LUTEIN) Oral Tab Take 1 tablet by mouth daily.      Naproxen Sodium 220 MG Oral Cap Take 220 mg by mouth daily.      Vitamin B-12 (VITAMIN B12) 500 MCG Oral Tab Take 1 tablet (500 mcg total) by mouth once a week. 30 tablet 11    Calcium Polycarbophil (FIBERCON OR) Take  by mouth.      Omeprazole Magnesium 20 MG Oral Tab EC Take 1 tablet (20 mg total) by mouth 2 (two) times daily. 60 tablet 11    CALCIUM 500 OR qd       Allergies[1]  Family History   Problem Relation Age of Onset    Heart Surgery Father         valve replacement    Infectious Disease Father         endocarditis    Heart Attack Father     Kidney Disease Father     Arthritis Mother     Gastro-Intestinal Disorder Mother         diverticulosis, bleeding    Infectious Disease Mother         pneumonia    Stroke Mother         hemiparesis, aphasia    Clotting Disorder Mother         Pe    Colon Cancer Mother     Infectious Disease Daughter         viral meningitis    Obesity Sister     Hypertension Sister     Infectious Disease Sister         MRSA    Pulmonary Disease Sister         on O2    Heart Attack Sister     Stroke Sister         nursing home    Substance Abuse Brother     Breast Cancer Sister 53        53    Obesity Sister     Neurological Disorder Sister         MS    BRCA gene + Sister     Diabetes Other     Thyroid Disorder Daughter     Neurological Disorder Daughter         headaches    Diabetes Other     Colon Cancer Other     Obesity Sister      Social History     Occupational History    Occupation: Office  Work/Adminstrative     Comment: works for    Tobacco Use    Smoking status: Never    Smokeless tobacco: Never   Vaping Use    Vaping status: Never Used   Substance and Sexual Activity    Alcohol use: Yes     Alcohol/week: 5.0 standard drinks of alcohol     Types: 5 Standard drinks or equivalent per week    Drug use: No    Sexual activity: Yes     Partners: Male        ROS:  Complete ROS reviewed by me and non-contributory to the chief complaint except as mentioned above.    Physical Exam:    Ht 5' 7\" (1.702 m)   Wt 205 lb (93 kg)   BMI 32.11 kg/m²   Constitutional: Well developed, well nourished 74 year old female  Psychological: NAD, alert and appropriate  Respiratory: Breathing comfortably on room air with RR of 10-14  Cardiac: Palpable distal pulses with pink warm extremities distally  Knees: Inspection reveals intact overlying skin without obvious discoloration but mild valgus on the left.  Significant patellofemoral crepitus is palpable through adequate arc of motion with trace flexion contracture more so on the left.  Mild patellofemoral and moderate tibiofemoral joint line tenderness is noted bilaterally.  No significant effusion is present bilaterally.  Ligaments are stable on gentle varus valgus stress with good endpoints.  Lachman and posterior drawer are both negative bilaterally.  Neurovascular status is intact on sensory, motor and perfusion assessment distally.    Imaging:     XR KNEE, COMPLETE (4 OR MORE VIEWS), RIGHT (CPT=73564)    Result Date: 1/20/2025  CONCLUSION:  Moderate medial lateral and patellofemoral compartment narrowing both knees.  No significant suprapatellar effusion.   LOCATION:  [id number]   Dictated by (CST): Katrin Neal MD on 1/20/2025 at 3:08 PM     Finalized by (CST): Katrin Neal MD on 1/20/2025 at 3:08 PM       XR KNEE, COMPLETE (4 OR MORE VIEWS), LEFT (CPT=73564)    Result Date: 1/20/2025  CONCLUSION:  Moderate medial lateral and patellofemoral compartment narrowing  both knees with associated hypertrophic spurring.  No significant knee effusion.   LOCATION:  [id number]   Dictated by (CST): Katrin Neal MD on 1/20/2025 at 3:07 PM     Finalized by (WILLIAN): Katrin Neal MD on 1/20/2025 at 3:08 PM          Multiple views right and left knees personally viewed, demonstrating bilateral osteoarthritic changes with joint space narrowing at the lateral compartment of the left knee, but no acute bony abnormalities.      Assessment/Diagnoses:  Diagnoses and all orders for this visit:    Primary osteoarthritis of both knees         Plan:  I reviewed imaging and exam findings with the patient.  The diagnosis is degenerative joint disease of the knees.  We discussed the etiology, natural history and treatment options in detail.  Treatments include activity modification, weight loss, anti-inflammatory use and possible injections.  In the long term, the patient's symptoms may progress and warrant consideration of total knee arthroplasty, but only if symptoms become severe.  For now, non-surgical treatment options are recommended.  We discussed the option for knee corticosteroid injection along with the potential risks, benefits and alternatives.  In light of progressive symptoms, the patient elects to proceed and gives written consent.  All questions were answered and the patient verbalized understanding and appreciation.  If symptoms are not improving over the next 2 to 4 weeks, I asked the patient to contact our office for possible next steps.    Knee Cortisone Injection Procedure:  After discussing risks, benefits and alternatives to cortisone injection including but not limited to needle infection, steroid flare, transient elevated blood sugars in diabetics or failed improvement, the patient gave written consent to proceed.  Using meticulous sterile technique, and after attempted aspiration, I injected 40 mg of Kenalog mixed with 4 cc of 1% Xylocaine at the lateral patellofemoral joint of  the right and left knees in sequence to minimal resistance.  The patient tolerated this well and a Band-Aid was applied to each knee.  Instructions were given to contact us if the patient experiences any adverse effects.      Magdalena Ramirez MD, [facility]  Orthopaedic Surgery   Sports Medicine/Knee and Shoulder  [facility]  t: [phone]  f: [phone]             This document was partially prepared using Dragon Medical voice recognition software.  Although every attempt is made to correct errors during dictation, discrepancies may still exist.           [1]   Allergies  Allergen Reactions    Hydrocodone DIZZINESS    Grass OTHER (SEE COMMENTS)     Sinus symptoms    Codeine NAUSEA ONLY     Nausea

## 2025-03-06 DIAGNOSIS — I10 ESSENTIAL HYPERTENSION, BENIGN: ICD-10-CM

## 2025-03-06 RX ORDER — LOSARTAN POTASSIUM 50 MG/1
TABLET ORAL
Qty: 90 TABLET | Refills: 1 | Status: SHIPPED | OUTPATIENT
Start: 2025-03-06

## 2025-03-06 NOTE — TELEPHONE ENCOUNTER
Last office visit: 1/20/25  Next office visit: 7/21/25  Last Refill: 6/21/24    Requested Prescriptions     Pending Prescriptions Disp Refills    losartan 50 MG Oral Tab 90 tablet 1     Sig: TAKE 1 TABLET(50 MG) BY MOUTH DAILY     Protocol passed

## 2025-03-26 ENCOUNTER — HOSPITAL ENCOUNTER (OUTPATIENT)
Dept: BONE DENSITY | Age: 75
Discharge: HOME | End: 2025-03-26
Attending: FAMILY MEDICINE
Payer: MEDICARE

## 2025-03-26 DIAGNOSIS — Z78.0 POSTMENOPAUSAL: ICD-10-CM

## 2025-03-26 DIAGNOSIS — Z13.820 OSTEOPOROSIS SCREENING: ICD-10-CM

## 2025-03-26 PROCEDURE — 77080 DXA BONE DENSITY AXIAL: CPT | Performed by: FAMILY MEDICINE

## 2025-06-05 DIAGNOSIS — M15.0 PRIMARY OSTEOARTHRITIS INVOLVING MULTIPLE JOINTS: ICD-10-CM

## 2025-06-05 RX ORDER — NABUMETONE 500 MG/1
500 TABLET, FILM COATED ORAL DAILY
Qty: 90 TABLET | Refills: 0 | Status: SHIPPED | OUTPATIENT
Start: 2025-06-05

## 2025-06-05 NOTE — TELEPHONE ENCOUNTER
Last Office Visit: 1/20/25  Last Refill: 6/21/24  Return to Clinic: 6 months   Protocol: passed   NOV: 7/21/25  Requested Prescriptions     Pending Prescriptions Disp Refills    nabumetone 500 MG Oral Tab 90 tablet 0     Sig: Take 1 tablet (500 mg total) by mouth daily.         Please approve if appropriate.     Thank you!

## 2025-07-21 ENCOUNTER — LAB ENCOUNTER (OUTPATIENT)
Dept: LAB | Age: 75
End: 2025-07-21
Attending: FAMILY MEDICINE
Payer: MEDICARE

## 2025-07-21 ENCOUNTER — OFFICE VISIT (OUTPATIENT)
Dept: FAMILY MEDICINE CLINIC | Facility: CLINIC | Age: 75
End: 2025-07-21
Payer: MEDICARE

## 2025-07-21 VITALS
SYSTOLIC BLOOD PRESSURE: 122 MMHG | WEIGHT: 202 LBS | RESPIRATION RATE: 16 BRPM | HEIGHT: 67 IN | DIASTOLIC BLOOD PRESSURE: 72 MMHG | HEART RATE: 68 BPM | TEMPERATURE: 98 F | BODY MASS INDEX: 31.71 KG/M2

## 2025-07-21 DIAGNOSIS — E78.2 MIXED HYPERLIPIDEMIA: ICD-10-CM

## 2025-07-21 DIAGNOSIS — I10 ESSENTIAL HYPERTENSION, BENIGN: ICD-10-CM

## 2025-07-21 DIAGNOSIS — M15.0 PRIMARY OSTEOARTHRITIS INVOLVING MULTIPLE JOINTS: ICD-10-CM

## 2025-07-21 DIAGNOSIS — Z12.31 ENCOUNTER FOR SCREENING MAMMOGRAM FOR MALIGNANT NEOPLASM OF BREAST: ICD-10-CM

## 2025-07-21 DIAGNOSIS — I10 ESSENTIAL HYPERTENSION, BENIGN: Primary | ICD-10-CM

## 2025-07-21 DIAGNOSIS — F34.1 CHRONIC DEPRESSIVE PERSONALITY DISORDER: ICD-10-CM

## 2025-07-21 LAB
ALBUMIN SERPL-MCNC: 4.3 G/DL (ref 3.2–4.8)
ALBUMIN/GLOB SERPL: 1.7 {RATIO} (ref 1–2)
ALP LIVER SERPL-CCNC: 65 U/L (ref 55–142)
ALT SERPL-CCNC: 35 U/L (ref 10–49)
ANION GAP SERPL CALC-SCNC: 9 MMOL/L (ref 0–18)
AST SERPL-CCNC: 28 U/L (ref ?–34)
BILIRUB SERPL-MCNC: 0.5 MG/DL (ref 0.2–1.1)
BUN BLD-MCNC: 14 MG/DL (ref 9–23)
CALCIUM BLD-MCNC: 9.1 MG/DL (ref 8.7–10.6)
CHLORIDE SERPL-SCNC: 105 MMOL/L (ref 98–112)
CHOLEST SERPL-MCNC: 164 MG/DL (ref ?–200)
CO2 SERPL-SCNC: 29 MMOL/L (ref 21–32)
CREAT BLD-MCNC: 0.96 MG/DL (ref 0.55–1.02)
EGFRCR SERPLBLD CKD-EPI 2021: 62 ML/MIN/1.73M2 (ref 60–?)
FASTING PATIENT LIPID ANSWER: YES
FASTING STATUS PATIENT QL REPORTED: YES
GLOBULIN PLAS-MCNC: 2.6 G/DL (ref 2–3.5)
GLUCOSE BLD-MCNC: 94 MG/DL (ref 70–99)
HDLC SERPL-MCNC: 75 MG/DL (ref 40–59)
LDLC SERPL CALC-MCNC: 73 MG/DL (ref ?–100)
NONHDLC SERPL-MCNC: 89 MG/DL (ref ?–130)
OSMOLALITY SERPL CALC.SUM OF ELEC: 296 MOSM/KG (ref 275–295)
POTASSIUM SERPL-SCNC: 4.4 MMOL/L (ref 3.5–5.1)
PROT SERPL-MCNC: 6.9 G/DL (ref 5.7–8.2)
SODIUM SERPL-SCNC: 143 MMOL/L (ref 136–145)
TRIGL SERPL-MCNC: 84 MG/DL (ref 30–149)
VLDLC SERPL CALC-MCNC: 13 MG/DL (ref 0–30)

## 2025-07-21 PROCEDURE — 36415 COLL VENOUS BLD VENIPUNCTURE: CPT

## 2025-07-21 PROCEDURE — G2211 COMPLEX E/M VISIT ADD ON: HCPCS | Performed by: FAMILY MEDICINE

## 2025-07-21 PROCEDURE — 99214 OFFICE O/P EST MOD 30 MIN: CPT | Performed by: FAMILY MEDICINE

## 2025-07-21 PROCEDURE — 80061 LIPID PANEL: CPT

## 2025-07-21 PROCEDURE — 80053 COMPREHEN METABOLIC PANEL: CPT

## 2025-07-21 RX ORDER — CITALOPRAM HYDROBROMIDE 20 MG/1
TABLET ORAL
Qty: 90 TABLET | Refills: 1 | Status: SHIPPED | OUTPATIENT
Start: 2025-07-21

## 2025-07-21 RX ORDER — LOSARTAN POTASSIUM 50 MG/1
TABLET ORAL
Qty: 90 TABLET | Refills: 1 | Status: SHIPPED | OUTPATIENT
Start: 2025-07-21

## 2025-07-21 RX ORDER — NABUMETONE 500 MG/1
500 TABLET, FILM COATED ORAL DAILY
Qty: 90 TABLET | Refills: 1 | Status: SHIPPED | OUTPATIENT
Start: 2025-07-21

## 2025-07-21 RX ORDER — ATORVASTATIN CALCIUM 10 MG/1
10 TABLET, FILM COATED ORAL NIGHTLY
Qty: 90 TABLET | Refills: 1 | Status: SHIPPED | OUTPATIENT
Start: 2025-07-21

## 2025-07-21 NOTE — PROGRESS NOTES
The following individual(s) verbally consented to be recorded using ambient AI listening technology and understand that they can each withdraw their consent to this listening technology at any point by asking the clinician to turn off or pause the recording:    Patient name: Cynthia Ольга Alis  Additional names:

## 2025-07-21 NOTE — PROGRESS NOTES
[facility] Family [facility] Office Note  Chief Complaint:   Chief Complaint   Patient presents with    Medication Follow-Up       HPI:   This is a 75 year old female coming in for follow up of:    1.  HTN - Patient presents for recheck of her hypertension. Pt has been taking medications as instructed, no medication side effects, home BP monitoring in the range of 120-130's systolic and 80's diastolic.    2.  Hyperlipidemia -patient is due for repeat lipid panel.  She is currently taking atorvastatin.  She had a heart scan done in 2022 with a calcium score approximately 149.  She does have a family history of coronary disease.  She also has a personal history of a mitral valve prolapse.  She is not having any chest pain or shortness of breath.  She takes ibuprofen most days for arthritic pain.      3.  Depression - stable.  Taking citalopram.  Denies any side effects from medication.    4.  OA - stable.  Taking advil in the am and nabumetone in the pm.    ** passed away suddenly recently but patient states she is doing ok**      Past Medical History:    Abnormal screening CT of heart    calcium score 28    Acquired ptosis of both eyelids    Allergic rhinitis due to pollen (aka 4770)    Anxiety state, unspecified    Bilateral senile cataracts    Cancer (HCC)    basal cell    Cataract    Chondral loose body of left knee joint    Degenerative tear of lateral meniscus, left    Degenerative tear of posterior horn of medial meniscus, left    Dysthymic disorder    Esophageal reflux    Essential hypertension, benign    Family history of colonic polyps, mother    High blood pressure    High cholesterol    History of basal cell cancer    skin, neck    IBS (irritable bowel syndrome)    Mitral valve prolapse    Osteoarthrosis, unspecified whether generalized or localized, unspecified site    hands knees    Personal history of colonic polyps    Plantar fasciitis of right foot    Pneumonia due to organism     Primary osteoarthritis of left knee    Ptosis, myogenic, left    Added automatically from request for surgery [id number]    Ptosis, myogenic, right    Added automatically from request for surgery [id number]    Pure hypercholesterolemia    Sleep apnea    Tear of medial meniscus of left knee    surgery scheduled 04/19/19    Tear of medial meniscus of left knee  Global 07/18/2019    Unspecified internal derangement of knee    right knee    Vertical strabismus, left eye    Added automatically from request for surgery [id number]    Visual impairment     Past Surgical History:   Procedure Laterality Date    Anesth,shoulder replacement Right 2/6/2015    right reverse shoulder replacement    Cataract extraction w/  intraocular lens implant Right 01/2020    Cataract extraction w/  intraocular lens implant Left 02/2020    Colonoscopy  3/8/2005    normal    Colonoscopy  2/5/2009    normal    Colonoscopy  08/19/2014    Dr. Vazquez- Repeat in 3 years    Colonoscopy N/A 09/11/2018    diverticulosis     Colonoscopy N/A 12/8/2022    Procedure: COLONOSCOPY with FORCEP POLYPECTOMY;  Surgeon: Bora Vazquez MD;  Location:  ENDOSCOPY    Ct heart w/ calcium scoring  2/2/2005    calcium score of 0    D & c  1988    Exc skin malig 0.6-1cm trunk,arm,leg  2009    skin BCC neck    Exc skin malig 2.1-3cm trunk,arm,leg Left 7/2010    left neck basal cell cancer    Excis urethral caruncle  1982    Eye muscle surg proc unlisted Left 05/29/2020    Left inferior oblique myectomy    Knee scope,med+lat menis repair Right 1995    right knee arthrscopy    Knee scope,med+lat menis repair Left 04/19/2019    medial and lateral meniscal repairs, loose body    Peripheral vascular screening historical conv Bilateral 5/9/2016    mild left carotid narrowing, PAD screen     Social History:  Social History     Socioeconomic History    Marital status:      Spouse name: Aldo    Number of children: 3    Years of education: 18   Occupational History    Occupation:  Office Work/Adminstrative     Comment: works for    Tobacco Use    Smoking status: Never    Smokeless tobacco: Never   Vaping Use    Vaping status: Never Used   Substance and Sexual Activity    Alcohol use: Yes     Alcohol/week: 5.0 standard drinks of alcohol     Types: 5 Standard drinks or equivalent per week    Drug use: No    Sexual activity: Yes     Partners: Male   Other Topics Concern     Service No    Blood Transfusions No    Caffeine Concern Yes     Comment: 2 cups coffee/day    Occupational Exposure No    Hobby Hazards No    Sleep Concern No    Stress Concern No    Weight Concern No    Special Diet No    Back Care No    Exercise Yes     Comment: 5x/week water aerobics, , barre class, walking    Bike Helmet Yes    Seat Belt Yes    Self-Exams Yes   Social History Narrative    Lives with     Enjoys hiking, bicycling    Volunteer at [employer] (2018)     Family History:  Family History   Problem Relation Age of Onset    Heart Surgery Father         valve replacement    Infectious Disease Father         endocarditis    Heart Attack Father     Kidney Disease Father     Arthritis Mother     Gastro-Intestinal Disorder Mother         diverticulosis, bleeding    Infectious Disease Mother         pneumonia    Stroke Mother         hemiparesis, aphasia    Clotting Disorder Mother         Pe    Colon Cancer Mother     Infectious Disease Daughter         viral meningitis    Obesity Sister     Hypertension Sister     Infectious Disease Sister         MRSA    Pulmonary Disease Sister         on O2    Heart Attack Sister     Stroke Sister         nursing home    Substance Abuse Brother     Breast Cancer Sister 53        53    Obesity Sister     Neurological Disorder Sister         MS    BRCA gene + Sister     Diabetes Other     Thyroid Disorder Daughter     Neurological Disorder Daughter         headaches    Diabetes Other     Colon Cancer Other     Obesity Sister       Allergies:  Allergies   Allergen Reactions    Hydrocodone DIZZINESS    Grass OTHER (SEE COMMENTS)     Sinus symptoms    Codeine NAUSEA ONLY     Nausea       Current Meds:  Current Outpatient Medications   Medication Sig Dispense Refill    atorvastatin 10 MG Oral Tab Take 1 tablet (10 mg total) by mouth nightly. 90 tablet 1    citalopram 20 MG Oral Tab TAKE 1 TABLET BY MOUTH EVERY DAY 90 tablet 1    losartan 50 MG Oral Tab TAKE 1 TABLET(50 MG) BY MOUTH DAILY 90 tablet 1    nabumetone 500 MG Oral Tab Take 1 tablet (500 mg total) by mouth daily. 90 tablet 1    hydrocortisone 2.5 % External Cream Apply 1 Application  topically 2 (two) times daily. Apply to eyebrows and cheeks 30 g 5    mupirocin 2 % External Ointment Apply 1 Application topically 2 (two) times daily as needed. Apply to nose twice daily 22 g 5    LYSINE OR Take by mouth.      betamethasone dipropionate 0.05 % External Lotion Apply 1 mL topically 2 (two) times daily. Apply to scalp 60 mL 5    fexofenadine 180 MG Oral Tab Take 1 tablet (180 mg total) by mouth daily.      LUTEIN OR Take by mouth daily.      Multiple Vitamins-Minerals (MACUVITE/LUTEIN) Oral Tab Take 1 tablet by mouth daily.      Naproxen Sodium 220 MG Oral Cap Take 220 mg by mouth daily.      Vitamin B-12 (VITAMIN B12) 500 MCG Oral Tab Take 1 tablet (500 mcg total) by mouth once a week. 30 tablet 11    Calcium Polycarbophil (FIBERCON OR) Take  by mouth.      Omeprazole Magnesium 20 MG Oral Tab EC Take 1 tablet (20 mg total) by mouth 2 (two) times daily. 60 tablet 11    CALCIUM 500 OR qd        Counseling given: Not Answered       REVIEW OF SYSTEMS:   ROS:  CONSTITUTIONAL:  Denies any unusual weight gain/loss, fever, chills, weakness or fatigue.  CARDIOVASCULAR:  Denies chest pain, chest pressure or chest discomfort. No palpitations or edema.  Denies any dyspnea on exertion or at rest  RESPIRATORY:  Denies shortness of breath, cough  GASTROINTESTINAL:  Denies any abdominal pain, nausea,  vomiting  NEUROLOGICAL:  Denies headache, dizziness, syncope, numbness or tingling in the extremities.  MUSCULOSKELETAL:  + multiple joint pains    EXAM:   /72   Pulse 68   Temp 98 °F (36.7 °C) (Temporal)   Resp 16   Ht 5' 7\" (1.702 m)   Wt 202 lb (91.6 kg)   BMI 31.64 kg/m²  Estimated body mass index is 31.64 kg/m² as calculated from the following:    Height as of this encounter: 5' 7\" (1.702 m).    Weight as of this encounter: 202 lb (91.6 kg).   Vital signs reviewed.Appears stated age, well groomed.  Physical Exam:  GEN:  Patient is alert and oriented x3, no apparent distress  HEAD:  Normocephalic, atraumatic  LUNGS: clear to auscultation bilaterally, no rales/rhonchi/wheezing  HEART:  Regular rate and rhythm, no murmurs, rubs or gallops  ABDOMEN:  Soft, nondistended, nontender, bowel sounds normal in all 4 quadrants, no hepatosplenomegaly  EXTREMITIES:  Strength intact with 5/5 bilaterally upper and lower extremities, no edema noted  NEURO:  CN 2 - 12 grossly intact     ASSESSMENT AND PLAN:   1. Essential hypertension, benign  -  Controlled  -  Check renal function  -  Monitor BP at home  -  Continue losartan 50mg daily    2. Mixed hyperlipidemia  -  Recheck lipid panel  -  Heart scan in 2022 with calcium score of approx 149.97  -  Continue low fat intake and exercise    3. Primary osteoarthritis involving multiple joints  -  Stable  -  Continue nabumetone    4. Chronic depressive personality disorder  -  Stable  -  Continue citalopram    5. Breast cancer screening  -  mammogram ordered      Meds & Refills for this Visit:  Requested Prescriptions     Signed Prescriptions Disp Refills    atorvastatin 10 MG Oral Tab 90 tablet 1     Sig: Take 1 tablet (10 mg total) by mouth nightly.    citalopram 20 MG Oral Tab 90 tablet 1     Sig: TAKE 1 TABLET BY MOUTH EVERY DAY    losartan 50 MG Oral Tab 90 tablet 1     Sig: TAKE 1 TABLET(50 MG) BY MOUTH DAILY    nabumetone 500 MG Oral Tab 90 tablet 1     Sig: Take 1  tablet (500 mg total) by mouth daily.       Health Maintenance:  FIT/FOBT Colorectal Screening Never done  COVID-19 Vaccine(3 - Booster for Moderna series) due on 09/02/2021  Colonoscopy due on 09/11/2021  Influenza Vaccine(1) due on 10/01/2021  Annual Physical due on 12/02/2021    Patient/Caregiver Education: Patient/Caregiver Education: There are no barriers to learning. Medical education done.   Outcome: Patient verbalizes understanding. Patient is notified to call with any questions, complications, allergies, or worsening or changing symptoms.  Patient is to call with any side effects or complications from the treatments as a result of today.     Problem List:  Patient Active Problem List   Diagnosis    Essential hypertension, benign    Chronic depressive personality disorder    GERD (gastroesophageal reflux disease)    Vitamin B 12 deficiency    Colon polyps    Family history of colon cancer, maternal aunt    Chronic kidney disease, stage 3a (HCC)    Primary osteoarthritis involving multiple joints    Mixed hyperlipidemia    Adult form of gluten-sensitive enteropathy    Elevated coronary artery calcium score    Bilateral ovarian cysts    Uterine cyst       ANTONIO MOSES, DO    Please note that portions of this note may have been completed with a voice recognition program. Efforts were made to edit the dictations but occasionally words are mis-transcribed.

## 2025-08-21 ENCOUNTER — HOSPITAL ENCOUNTER (OUTPATIENT)
Dept: MAMMOGRAPHY | Facility: HOSPITAL | Age: 75
Discharge: HOME | End: 2025-08-21
Attending: FAMILY MEDICINE

## 2025-08-21 DIAGNOSIS — Z12.31 ENCOUNTER FOR SCREENING MAMMOGRAM FOR MALIGNANT NEOPLASM OF BREAST: ICD-10-CM

## 2025-08-21 PROCEDURE — 77063 BREAST TOMOSYNTHESIS BI: CPT | Performed by: FAMILY MEDICINE

## 2025-08-21 PROCEDURE — 77067 SCR MAMMO BI INCL CAD: CPT | Performed by: FAMILY MEDICINE

## (undated) DEVICE — SOLUTION IRRIG 1000ML 0.9% NACL USP BTL

## (undated) DEVICE — ZIMMER® STERILE DISPOSABLE TOURNIQUET CUFF WITH PLC, DUAL PORT, SINGLE BLADDER, 34 IN. (86 CM)

## (undated) DEVICE — STERILE POLYISOPRENE POWDER-FREE SURGICAL GLOVES: Brand: PROTEXIS

## (undated) DEVICE — Device: Brand: DEFENDO AIR/WATER/SUCTION AND BIOPSY VALVE

## (undated) DEVICE — KENDALL SCD EXPRESS SLEEVES, KNEE LENGTH, MEDIUM: Brand: KENDALL SCD

## (undated) DEVICE — 10K/24K ARTHROSCOPY INFLOW TUBE SET: Brand: 10K/24K

## (undated) DEVICE — LAPAROSCOPY: Brand: MEDLINE INDUSTRIES, INC.

## (undated) DEVICE — [HIGH FLOW INSUFFLATOR,  DO NOT USE IF PACKAGE IS DAMAGED,  KEEP DRY,  KEEP AWAY FROM SUNLIGHT,  PROTECT FROM HEAT AND RADIOACTIVE SOURCES.]: Brand: PNEUMOSURE

## (undated) DEVICE — DISPOSABLE GRASPER CARTRIDGE: Brand: DIRECT DRIVE REPOSABLE GRASPERS

## (undated) DEVICE — TISSUE RETRIEVAL SYSTEM: Brand: INZII RETRIEVAL SYSTEM

## (undated) DEVICE — TROCAR: Brand: KII FIOS FIRST ENTRY

## (undated) DEVICE — PADDING CAST COTTON  4

## (undated) DEVICE — SUT MCRYL 4-0 27IN ABSRB UD 19MM PS-2 3/8

## (undated) DEVICE — 12 ML SYRINGE LUER-LOCK TIP: Brand: MONOJECT

## (undated) DEVICE — NON-ADHERENT STRIPS,OIL EMULSION: Brand: CURITY

## (undated) DEVICE — SOL  .9 3000ML

## (undated) DEVICE — 6 ML SYRINGE LUER-LOCK TIP: Brand: MONOJECT

## (undated) DEVICE — DYONICS 3.5 MM INCISOR PLUS ELITE                                    STRAIGHT DISPOSABLE BLADES,                                    POWER/EP-1, WHITE, PACKAGED 6 PER                                    BOX, STERILE

## (undated) DEVICE — INSUFFLATION NEEDLE TO ESTABLISH PNEUMOPERITONEUM.: Brand: INSUFFLATION NEEDLE

## (undated) DEVICE — SUT COAT VCRL+ 0 27IN UR-6 ABSRB VLT ANTIBACT

## (undated) DEVICE — FORCEP BIOPSY RJ4 LG CAP W/ND

## (undated) DEVICE — ADHESIVE SKIN TOP FOR WND CLSR DERMBND ADV

## (undated) DEVICE — KNEE ARTHROSCOPY CDS: Brand: MEDLINE INDUSTRIES, INC.

## (undated) DEVICE — SUTURE ETHILON 5-0 PS-3

## (undated) DEVICE — GLOVE SUR 6.5 SENSICARE PIP WHT PWD F

## (undated) DEVICE — FILTERLINE NASAL ADULT O2/CO2

## (undated) DEVICE — 5 MM BABCOCKS WITH RATCHET HANDLES: Brand: ENDOPATH

## (undated) DEVICE — MARYLAND JAW LAPAROSCOPIC SEALER/DIVIDER COATED: Brand: LIGASURE

## (undated) DEVICE — DRAPE, LAPAROSCOPY PELVISCOPY: Brand: MEDLINE

## (undated) DEVICE — ENDOSCOPY PACK - LOWER: Brand: MEDLINE INDUSTRIES, INC.

## (undated) DEVICE — 1200CC GUARDIAN II: Brand: GUARDIAN

## (undated) DEVICE — TROCAR: Brand: KII® SLEEVE

## (undated) DEVICE — 3M™ RED DOT™ MONITORING ELECTRODE WITH FOAM TAPE AND STICKY GEL, 50/BAG, 20/CASE, 72/PLT 2570: Brand: RED DOT™

## (undated) DEVICE — GAMMEX® PI HYBRID SIZE 8.5, STERILE POWDER-FREE SURGICAL GLOVE, POLYISOPRENE AND NEOPRENE BLEND: Brand: GAMMEX

## (undated) NOTE — LETTER
Patient Name: Melo Quijano  YOB: 1950          MRN number:  [id number]  Date:  6/5/2019  Referring Physician:  Dr. Cass Black     Discharge Summary    Pt has attended 12 visits in Physical Therapy.      Dx: s/p L knee arthroscopy -medial 5x sit<>stand: 10 (WAS:21 sec, no UE  - had some L knee pain)  TUG (AD, time): 10 sec         PLAN OF CARE:    Goals: (To be met in 12 visits)   · Pt will improve knee extension ROM to 0 deg to allow proper heel strike during gait and terminal knee extensi

## (undated) NOTE — LETTER
18    Patient: Denisse Martinez  : 1950 Visit date: 2018    Dear  Dr. Eileen Rhodes MD,    Thank you for referring Denisse Martinez to my practice. Please find my assessment and plan below.                 Assessment   Polyp of colon, unspeci patient's personal and family history of colon polyps, and family history of colon cancer, she should continue with colonoscopy every 3 years in order to monitor for any additional colon polyps or precancerous lesions.     The patient's next examination is

## (undated) NOTE — LETTER
22    Patient: Phillip June  : 1950 Visit date: 2022    Dear  Anibal Peck,     Thank you for referring Phillip June to my practice. Please find my assessment and plan below. Assessment   Polyp of colon, unspecified part of colon, unspecified type  (primary encounter diagnosis)  Family history of colon cancer, maternal aunt  Adult form of gluten-sensitive enteropathy  Family history of colonic polyps, mother  Essential hypertension, benign    Plan   I am seeing this patient in consultation regarding further care and treatment of her colon polyps, family history of colon cancer in her maternal aunt in her 62s, and a family history of her mother having had colon polyps. This patient has no bright red blood per rectum or melena. She has no narrowing of the stool or mucus in the stool. She has no history of diarrhea or constipation that is ongoing at this time. She was diagnosed with irritable bowel syndrome, however found out that she was \"gluten sensitive\". All of her irritable bowel symptoms have now resolved on a gluten-free diet. She has no abdominal pain or distention. She has not suffered weight loss as a symptom. She has no first-degree or second-degree relatives with cancer of the uterus. She has had no prior abdominal operations. She does take an aspirin 81 mg daily. We have asked her to stop that 10 days prior to the procedure. She has a mitral valve murmur for which she is asked to take prophylaxis of ampicillin and gentamicin. We will add that to her regimen. Clinical exam today reveals her abdomen to be soft, nontender, nondistended, good bowel sounds. No guarding or rebound. No masses. No ascites. The liver is normal, spleen is not palpable. Patient weighs 200 pounds. Bowel sounds have normal activity normal pitch. There are no inguinal umbilical hernias present.     We are scheduling her for colonoscopy within the next few months.            Sincerely,       Guevara Garcia MD   CC: No Recipients

## (undated) NOTE — LETTER
Patient Name: Juhi Hernandez  YOB: 1950          MRN :  [id number]  Date:  5/17/2021  Referring Physician: Dr. Kailyn Mejía  Primary MD: Ben Lew        Discharge Summary  Pt has attended 20 visits in Physical Therapy.   Right knee pain, unspecified unlimited painfree walking. All goals met. Pt motivated to continue HEP. Pt has returned to all activities of daily living without pain or difficulty.     Goals:   Goals: (to be met in 12+8 visits)-  · Pt will demonstrate improved DF AROM to >= 10 degre

## (undated) NOTE — LETTER
MINOR CASE LETTER      7/12/2024        Dear Cynthia,    Your are having a Laparascopy bilateral salpinoophrectomy on Monday, August 5th at 7:30AM.    Do not eat or drink anything (including water) after midnight the night before surgery.    If your procedure is scheduled later in the day, then nothing by mouth for 6 hours before arrival to the hospital.    You are to call this office if you have any cold or flu symptoms 2 days before your scheduled surgery.    Please avoid aspirin 7 days before surgery.  Avoid Ibuprofen, Motrin, Aleve, or Naprosyn for 3 days before surgery.    You will be contacted by PreAdmission Testing (PAT) usually within the week before surgery.  They will take a short medical history and let you know if any preoperative testing is needed.    Please make arrangements for someone to drive you home after your surgery..    Please feel free to contact our office at [phone] if you have any questions regarding these instructions or your procedure.      Sincerely,    Cathleen John MD  [facility] - OB/GYN  [address]  [facility] [id number] [phone]